# Patient Record
Sex: MALE | Race: WHITE | NOT HISPANIC OR LATINO
[De-identification: names, ages, dates, MRNs, and addresses within clinical notes are randomized per-mention and may not be internally consistent; named-entity substitution may affect disease eponyms.]

---

## 2020-02-13 ENCOUNTER — APPOINTMENT (OUTPATIENT)
Dept: GASTROENTEROLOGY | Facility: CLINIC | Age: 75
End: 2020-02-13
Payer: MEDICARE

## 2020-02-13 VITALS
DIASTOLIC BLOOD PRESSURE: 90 MMHG | HEART RATE: 93 BPM | TEMPERATURE: 99.1 F | OXYGEN SATURATION: 98 % | HEIGHT: 69 IN | BODY MASS INDEX: 28.44 KG/M2 | SYSTOLIC BLOOD PRESSURE: 170 MMHG | WEIGHT: 192 LBS

## 2020-02-13 DIAGNOSIS — K62.5 HEMORRHAGE OF ANUS AND RECTUM: ICD-10-CM

## 2020-02-13 DIAGNOSIS — K59.09 OTHER CONSTIPATION: ICD-10-CM

## 2020-02-13 PROCEDURE — 99203 OFFICE O/P NEW LOW 30 MIN: CPT

## 2020-02-13 RX ORDER — SODIUM SULFATE, POTASSIUM SULFATE, MAGNESIUM SULFATE 17.5; 3.13; 1.6 G/ML; G/ML; G/ML
17.5-3.13-1.6 SOLUTION, CONCENTRATE ORAL
Qty: 1 | Refills: 0 | Status: ACTIVE | COMMUNITY
Start: 2020-02-13 | End: 1900-01-01

## 2020-02-13 RX ORDER — DILTIAZEM HYDROCHLORIDE 120 MG/1
120 CAPSULE, EXTENDED RELEASE ORAL
Refills: 0 | Status: ACTIVE | COMMUNITY

## 2020-02-13 NOTE — PHYSICAL EXAM
[General Appearance - Alert] : alert [General Appearance - In No Acute Distress] : in no acute distress [General Appearance - Well Nourished] : well nourished [General Appearance - Well-Appearing] : healthy appearing [General Appearance - Well Developed] : well developed [Neck Appearance] : the appearance of the neck was normal [Sclera] : the sclera and conjunctiva were normal [Neck Cervical Mass (___cm)] : no neck mass was observed [Jugular Venous Distention Increased] : there was no jugular-venous distention [Apical Impulse] : the apical impulse was normal [Auscultation Breath Sounds / Voice Sounds] : lungs were clear to auscultation bilaterally [Heart Rate And Rhythm] : heart rate was normal and rhythm regular [Full Pulse] : the pedal pulses are present [Edema] : there was no peripheral edema [Abdomen Tenderness] : non-tender [Bowel Sounds] : normal bowel sounds [Abdomen Soft] : soft [Abdomen Mass (___ Cm)] : no abdominal mass palpated [No Rectal Mass] : no rectal mass [Occult Blood Positive] : stool was negative for occult blood [Normal Sphincter Tone] : normal sphincter tone [Cervical Lymph Nodes Enlarged Anterior Bilaterally] : anterior cervical [Cervical Lymph Nodes Enlarged Posterior Bilaterally] : posterior cervical [Supraclavicular Lymph Nodes Enlarged Bilaterally] : supraclavicular [Axillary Lymph Nodes Enlarged Bilaterally] : axillary [Femoral Lymph Nodes Enlarged Bilaterally] : femoral [Inguinal Lymph Nodes Enlarged Bilaterally] : inguinal [No CVA Tenderness] : no ~M costovertebral angle tenderness [No Spinal Tenderness] : no spinal tenderness [Abnormal Walk] : normal gait [Nail Clubbing] : no clubbing  or cyanosis of the fingernails [Involuntary Movements] : no involuntary movements were seen [Musculoskeletal - Swelling] : no joint swelling seen [Skin Color & Pigmentation] : normal skin color and pigmentation [Motor Tone] : muscle strength and tone were normal [] : no rash [No Focal Deficits] : no focal deficits [Skin Turgor] : normal skin turgor [Impaired Insight] : insight and judgment were intact [Oriented To Time, Place, And Person] : oriented to person, place, and time [Affect] : the affect was normal

## 2020-02-13 NOTE — HISTORY OF PRESENT ILLNESS
[de-identified] : Dr. Willian Corona will be taking care of this patient who is recently moved from [place]\par \par The patient has chronic constipation\par \par He's noticed blood per rectum on the tissue paper and toilet water\par \par He is not weak or dizzy\par \par The stool is brown\par \par The stool is hard\par \par Is not tender flat\par \par No abdominal pain\par \par No pelvic, rectal or anal pain\par \par He's tried Colace without benefit\par \par Dulcolax some help\par \par No recent colonoscopy\par \par No family history of colorectal cancer\par \par His peripheral vascular disease and takes an aspirin\par \par He does not have any coronary disease that is aware of

## 2020-02-13 NOTE — ASSESSMENT
[FreeTextEntry1] : Impression\par \par Chronic constipation\par \par Bright red blood per rectum\par \par Hard stool on rectal examination, no mass, possible internal hemorrhoid\par \par Suggest\par \par MiraLax on a daily basis\par \par Dulcolax at night\par \par Colonoscopy\par \par Suprep\par \par The laxative, or its risks benefits and alternatives have been thoroughly reviewed with the patient in great detail. The laxative instructions have been reviewed in great detail with the patient.\par \par Risks/benefits:\par The procedure, the risks and benefits and alternatives have been reviewed in great detail with the patient.  Risks including, but not limited to sedation such as cardiac and pulmonary compromise, the procedure itself such as bleeding requiring hospitalization, transfusion, surgery, temporary or permanent colostomy.  Perforation or puncture of the requiring hospitalization, surgery, temporary colostomy.\par It has been explained to the patient that though colonoscopy is thought to be the best screening exam for colon cancer and polyps, no screening exam can find all colon polyps or cancers.  \par The patient expresses understanding of the procedure and consents to undergoing the procedure.\par \par

## 2020-03-31 ENCOUNTER — APPOINTMENT (OUTPATIENT)
Dept: GASTROENTEROLOGY | Facility: AMBULATORY MEDICAL SERVICES | Age: 75
End: 2020-03-31

## 2020-07-27 ENCOUNTER — TRANSCRIPTION ENCOUNTER (OUTPATIENT)
Age: 75
End: 2020-07-27

## 2020-07-27 ENCOUNTER — INPATIENT (INPATIENT)
Facility: HOSPITAL | Age: 75
LOS: 3 days | Discharge: NURSING, REHAB OR LONG-TERM CARE | DRG: 470 | End: 2020-07-31
Attending: FAMILY MEDICINE | Admitting: INTERNAL MEDICINE
Payer: MEDICARE

## 2020-07-27 VITALS
SYSTOLIC BLOOD PRESSURE: 164 MMHG | WEIGHT: 179.9 LBS | HEART RATE: 86 BPM | DIASTOLIC BLOOD PRESSURE: 91 MMHG | HEIGHT: 68 IN | OXYGEN SATURATION: 96 % | RESPIRATION RATE: 18 BRPM | TEMPERATURE: 98 F

## 2020-07-27 DIAGNOSIS — Z98.890 OTHER SPECIFIED POSTPROCEDURAL STATES: Chronic | ICD-10-CM

## 2020-07-27 DIAGNOSIS — S72.002A FRACTURE OF UNSPECIFIED PART OF NECK OF LEFT FEMUR, INITIAL ENCOUNTER FOR CLOSED FRACTURE: ICD-10-CM

## 2020-07-27 LAB
ALBUMIN SERPL ELPH-MCNC: 3.9 G/DL — SIGNIFICANT CHANGE UP (ref 3.3–5)
ALP SERPL-CCNC: 101 U/L — SIGNIFICANT CHANGE UP (ref 40–120)
ALT FLD-CCNC: 20 U/L — SIGNIFICANT CHANGE UP (ref 12–78)
ANION GAP SERPL CALC-SCNC: 4 MMOL/L — LOW (ref 5–17)
APPEARANCE UR: CLEAR — SIGNIFICANT CHANGE UP
AST SERPL-CCNC: 22 U/L — SIGNIFICANT CHANGE UP (ref 15–37)
BACTERIA # UR AUTO: ABNORMAL
BASOPHILS # BLD AUTO: 0.08 K/UL — SIGNIFICANT CHANGE UP (ref 0–0.2)
BASOPHILS NFR BLD AUTO: 0.7 % — SIGNIFICANT CHANGE UP (ref 0–2)
BILIRUB SERPL-MCNC: 1.2 MG/DL — SIGNIFICANT CHANGE UP (ref 0.2–1.2)
BILIRUB UR-MCNC: NEGATIVE — SIGNIFICANT CHANGE UP
BLD GP AB SCN SERPL QL: SIGNIFICANT CHANGE UP
BUN SERPL-MCNC: 25 MG/DL — HIGH (ref 7–23)
CALCIUM SERPL-MCNC: 9.5 MG/DL — SIGNIFICANT CHANGE UP (ref 8.5–10.1)
CHLORIDE SERPL-SCNC: 108 MMOL/L — SIGNIFICANT CHANGE UP (ref 96–108)
CK SERPL-CCNC: 217 U/L — SIGNIFICANT CHANGE UP (ref 26–308)
CO2 SERPL-SCNC: 27 MMOL/L — SIGNIFICANT CHANGE UP (ref 22–31)
COLOR SPEC: YELLOW — SIGNIFICANT CHANGE UP
CREAT SERPL-MCNC: 1.4 MG/DL — HIGH (ref 0.5–1.3)
DIFF PNL FLD: ABNORMAL
EOSINOPHIL # BLD AUTO: 0 K/UL — SIGNIFICANT CHANGE UP (ref 0–0.5)
EOSINOPHIL NFR BLD AUTO: 0 % — SIGNIFICANT CHANGE UP (ref 0–6)
EPI CELLS # UR: SIGNIFICANT CHANGE UP
GLUCOSE SERPL-MCNC: 116 MG/DL — HIGH (ref 70–99)
GLUCOSE UR QL: NEGATIVE — SIGNIFICANT CHANGE UP
HCT VFR BLD CALC: 43.8 % — SIGNIFICANT CHANGE UP (ref 39–50)
HGB BLD-MCNC: 15.1 G/DL — SIGNIFICANT CHANGE UP (ref 13–17)
HYALINE CASTS # UR AUTO: ABNORMAL /LPF
IMM GRANULOCYTES NFR BLD AUTO: 0.4 % — SIGNIFICANT CHANGE UP (ref 0–1.5)
INR BLD: 1.01 RATIO — SIGNIFICANT CHANGE UP (ref 0.88–1.16)
KETONES UR-MCNC: ABNORMAL
LEUKOCYTE ESTERASE UR-ACNC: NEGATIVE — SIGNIFICANT CHANGE UP
LYMPHOCYTES # BLD AUTO: 0.78 K/UL — LOW (ref 1–3.3)
LYMPHOCYTES # BLD AUTO: 6.4 % — LOW (ref 13–44)
MCHC RBC-ENTMCNC: 31.3 PG — SIGNIFICANT CHANGE UP (ref 27–34)
MCHC RBC-ENTMCNC: 34.5 GM/DL — SIGNIFICANT CHANGE UP (ref 32–36)
MCV RBC AUTO: 90.7 FL — SIGNIFICANT CHANGE UP (ref 80–100)
MONOCYTES # BLD AUTO: 0.76 K/UL — SIGNIFICANT CHANGE UP (ref 0–0.9)
MONOCYTES NFR BLD AUTO: 6.2 % — SIGNIFICANT CHANGE UP (ref 2–14)
NEUTROPHILS # BLD AUTO: 10.61 K/UL — HIGH (ref 1.8–7.4)
NEUTROPHILS NFR BLD AUTO: 86.3 % — HIGH (ref 43–77)
NITRITE UR-MCNC: NEGATIVE — SIGNIFICANT CHANGE UP
NRBC # BLD: 0 /100 WBCS — SIGNIFICANT CHANGE UP (ref 0–0)
PH UR: 5 — SIGNIFICANT CHANGE UP (ref 5–8)
PLATELET # BLD AUTO: 236 K/UL — SIGNIFICANT CHANGE UP (ref 150–400)
POTASSIUM SERPL-MCNC: 4.2 MMOL/L — SIGNIFICANT CHANGE UP (ref 3.5–5.3)
POTASSIUM SERPL-SCNC: 4.2 MMOL/L — SIGNIFICANT CHANGE UP (ref 3.5–5.3)
PROT SERPL-MCNC: 7.9 G/DL — SIGNIFICANT CHANGE UP (ref 6–8.3)
PROT UR-MCNC: 30 MG/DL
PROTHROM AB SERPL-ACNC: 11.8 SEC — SIGNIFICANT CHANGE UP (ref 10.6–13.6)
RBC # BLD: 4.83 M/UL — SIGNIFICANT CHANGE UP (ref 4.2–5.8)
RBC # FLD: 12.7 % — SIGNIFICANT CHANGE UP (ref 10.3–14.5)
RBC CASTS # UR COMP ASSIST: SIGNIFICANT CHANGE UP /HPF (ref 0–4)
SARS-COV-2 RNA SPEC QL NAA+PROBE: SIGNIFICANT CHANGE UP
SODIUM SERPL-SCNC: 139 MMOL/L — SIGNIFICANT CHANGE UP (ref 135–145)
SP GR SPEC: 1.02 — SIGNIFICANT CHANGE UP (ref 1.01–1.02)
UROBILINOGEN FLD QL: NEGATIVE — SIGNIFICANT CHANGE UP
WBC # BLD: 12.28 K/UL — HIGH (ref 3.8–10.5)
WBC # FLD AUTO: 12.28 K/UL — HIGH (ref 3.8–10.5)
WBC UR QL: SIGNIFICANT CHANGE UP

## 2020-07-27 PROCEDURE — 99223 1ST HOSP IP/OBS HIGH 75: CPT | Mod: AI

## 2020-07-27 PROCEDURE — 72131 CT LUMBAR SPINE W/O DYE: CPT | Mod: 26

## 2020-07-27 PROCEDURE — 73552 X-RAY EXAM OF FEMUR 2/>: CPT | Mod: 26,LT

## 2020-07-27 PROCEDURE — 99285 EMERGENCY DEPT VISIT HI MDM: CPT

## 2020-07-27 PROCEDURE — 73700 CT LOWER EXTREMITY W/O DYE: CPT | Mod: 26,LT

## 2020-07-27 PROCEDURE — 99222 1ST HOSP IP/OBS MODERATE 55: CPT

## 2020-07-27 PROCEDURE — 72110 X-RAY EXAM L-2 SPINE 4/>VWS: CPT | Mod: 26

## 2020-07-27 PROCEDURE — 93010 ELECTROCARDIOGRAM REPORT: CPT

## 2020-07-27 PROCEDURE — 71045 X-RAY EXAM CHEST 1 VIEW: CPT | Mod: 26

## 2020-07-27 PROCEDURE — 73502 X-RAY EXAM HIP UNI 2-3 VIEWS: CPT | Mod: 26,LT

## 2020-07-27 RX ORDER — ATORVASTATIN CALCIUM 80 MG/1
1 TABLET, FILM COATED ORAL
Qty: 0 | Refills: 0 | DISCHARGE

## 2020-07-27 RX ORDER — ATORVASTATIN CALCIUM 80 MG/1
0 TABLET, FILM COATED ORAL
Qty: 0 | Refills: 0 | DISCHARGE

## 2020-07-27 RX ORDER — SODIUM CHLORIDE 9 MG/ML
1000 INJECTION INTRAMUSCULAR; INTRAVENOUS; SUBCUTANEOUS ONCE
Refills: 0 | Status: COMPLETED | OUTPATIENT
Start: 2020-07-27 | End: 2020-07-27

## 2020-07-27 RX ORDER — HYDRALAZINE HCL 50 MG
5 TABLET ORAL EVERY 8 HOURS
Refills: 0 | Status: DISCONTINUED | OUTPATIENT
Start: 2020-07-27 | End: 2020-07-28

## 2020-07-27 RX ORDER — OXYCODONE HYDROCHLORIDE 5 MG/1
10 TABLET ORAL EVERY 6 HOURS
Refills: 0 | Status: DISCONTINUED | OUTPATIENT
Start: 2020-07-27 | End: 2020-07-28

## 2020-07-27 RX ORDER — HEPARIN SODIUM 5000 [USP'U]/ML
5000 INJECTION INTRAVENOUS; SUBCUTANEOUS EVERY 8 HOURS
Refills: 0 | Status: COMPLETED | OUTPATIENT
Start: 2020-07-27 | End: 2020-07-27

## 2020-07-27 RX ORDER — DILTIAZEM HCL 120 MG
120 CAPSULE, EXT RELEASE 24 HR ORAL DAILY
Refills: 0 | Status: DISCONTINUED | OUTPATIENT
Start: 2020-07-27 | End: 2020-07-27

## 2020-07-27 RX ORDER — DILTIAZEM HCL 120 MG
1 CAPSULE, EXT RELEASE 24 HR ORAL
Qty: 0 | Refills: 0 | DISCHARGE

## 2020-07-27 RX ORDER — DILTIAZEM HCL 120 MG
0 CAPSULE, EXT RELEASE 24 HR ORAL
Qty: 0 | Refills: 0 | DISCHARGE

## 2020-07-27 RX ORDER — SODIUM CHLORIDE 9 MG/ML
1000 INJECTION, SOLUTION INTRAVENOUS
Refills: 0 | Status: DISCONTINUED | OUTPATIENT
Start: 2020-07-27 | End: 2020-07-28

## 2020-07-27 RX ORDER — KETOROLAC TROMETHAMINE 30 MG/ML
15 SYRINGE (ML) INJECTION ONCE
Refills: 0 | Status: DISCONTINUED | OUTPATIENT
Start: 2020-07-27 | End: 2020-07-27

## 2020-07-27 RX ORDER — TRAMADOL HYDROCHLORIDE 50 MG/1
50 TABLET ORAL EVERY 6 HOURS
Refills: 0 | Status: DISCONTINUED | OUTPATIENT
Start: 2020-07-27 | End: 2020-07-28

## 2020-07-27 RX ORDER — DILTIAZEM HCL 120 MG
120 CAPSULE, EXT RELEASE 24 HR ORAL DAILY
Refills: 0 | Status: DISCONTINUED | OUTPATIENT
Start: 2020-07-27 | End: 2020-07-28

## 2020-07-27 RX ORDER — MORPHINE SULFATE 50 MG/1
2 CAPSULE, EXTENDED RELEASE ORAL ONCE
Refills: 0 | Status: DISCONTINUED | OUTPATIENT
Start: 2020-07-27 | End: 2020-07-27

## 2020-07-27 RX ORDER — SODIUM CHLORIDE 9 MG/ML
1000 INJECTION INTRAMUSCULAR; INTRAVENOUS; SUBCUTANEOUS
Refills: 0 | Status: DISCONTINUED | OUTPATIENT
Start: 2020-07-27 | End: 2020-07-27

## 2020-07-27 RX ORDER — ACETAMINOPHEN 500 MG
650 TABLET ORAL EVERY 6 HOURS
Refills: 0 | Status: DISCONTINUED | OUTPATIENT
Start: 2020-07-27 | End: 2020-07-28

## 2020-07-27 RX ORDER — ATORVASTATIN CALCIUM 80 MG/1
20 TABLET, FILM COATED ORAL AT BEDTIME
Refills: 0 | Status: DISCONTINUED | OUTPATIENT
Start: 2020-07-27 | End: 2020-07-28

## 2020-07-27 RX ORDER — ASPIRIN/CALCIUM CARB/MAGNESIUM 324 MG
1 TABLET ORAL
Qty: 0 | Refills: 0 | DISCHARGE

## 2020-07-27 RX ORDER — ASPIRIN/CALCIUM CARB/MAGNESIUM 324 MG
81 TABLET ORAL DAILY
Refills: 0 | Status: DISCONTINUED | OUTPATIENT
Start: 2020-07-27 | End: 2020-07-28

## 2020-07-27 RX ADMIN — MORPHINE SULFATE 2 MILLIGRAM(S): 50 CAPSULE, EXTENDED RELEASE ORAL at 14:14

## 2020-07-27 RX ADMIN — Medication 15 MILLIGRAM(S): at 16:30

## 2020-07-27 RX ADMIN — SODIUM CHLORIDE 1000 MILLILITER(S): 9 INJECTION INTRAMUSCULAR; INTRAVENOUS; SUBCUTANEOUS at 14:14

## 2020-07-27 RX ADMIN — Medication 15 MILLIGRAM(S): at 19:44

## 2020-07-27 RX ADMIN — SODIUM CHLORIDE 1000 MILLILITER(S): 9 INJECTION INTRAMUSCULAR; INTRAVENOUS; SUBCUTANEOUS at 20:58

## 2020-07-27 RX ADMIN — TRAMADOL HYDROCHLORIDE 50 MILLIGRAM(S): 50 TABLET ORAL at 21:57

## 2020-07-27 RX ADMIN — MORPHINE SULFATE 2 MILLIGRAM(S): 50 CAPSULE, EXTENDED RELEASE ORAL at 16:46

## 2020-07-27 RX ADMIN — TRAMADOL HYDROCHLORIDE 50 MILLIGRAM(S): 50 TABLET ORAL at 22:57

## 2020-07-27 NOTE — ED PROVIDER NOTE - ATTENDING CONTRIBUTION TO CARE
I have personally performed a face to face bedside history and physical examination of this patient. I have discussed the history, examination, review of systems, assessment and plan of management with the resident. I have reviewed the electronic medical record and amended it to reflect my history, review of systems, physical exam, assessment and plan. Patient brought in by ambulance s/p fall yesterday, c/o left hip pain, patient alert and oriented, states he has a history of left leg weakness, uses a cane to ambulate, last night while walking his left leg gave out and he collapsed to the ground, patient c/o left hip, medial thigh pain, worse with movement, f/u labs, xrays, ct scan, pain control, admit.

## 2020-07-27 NOTE — ED PROVIDER NOTE - NS ED ROS FT
CONSTITUTIONAL: No fevers or chills  HEENT:  No headache  RESPIRATORY: No cough, No shortness of breath  CARDIOVASCULAR: No chest pain   GASTROINTESTINAL: No abdominal pain, no nausea,   GENITOURINARY: No incontinence  NEUROLOGICAL: chronic lower exremity weakness, no dizziness   MSK: L sided hip pain, L lower back pain that radiates down leg to knee  All other review of systems is negative unless indicated above.

## 2020-07-27 NOTE — CONSULT NOTE ADULT - SUBJECTIVE AND OBJECTIVE BOX
[facility] - Billy Tejada, Angel Caballero, Robbi, Luisito, Onel Aguilar  Office Number: [phone]    Initial Consult Note  CHIEF COMPLAINT: Patient is a 74y old  Male who presents with a chief complaint of fall  HPI:  73 yo male PMH HTN, HLD, herniated lumbar disc presents to ED s/p fall on his L side last night around 11:30 pm. Patient states he fell on his L side and injured his L hip and L lower back with sharp pain that radiates down his leg. Patient states he has a history of generalized leg weakness with occasional "leg-buckling." Patient believes that is what caused him to fall. Denies dizziness or HA at time of fall. Denies hitting head or LOC. Patient states his son helped him up after the fall and laid him in bed, he has not be able to walk or move leg since fall due to pain. Tried taking Motrin 200 mg with minimal relief.     In ED. T(F): 97.8 HR: 86 BP: 164/91 RR: 18 SpO2: 96%. Labs remarkable for WBC 12.28, creat 1.40, . Ct hip showed acute mildly displaced left femoral neck fracture. Plan for OR for operative fixation left hip with hemiarthroplasty. Cardiology consulted for Cardiac optimization pre/post op. Denies fever, chills, chest pain, SOB, palpitation, orthopnea, PND, dizziness, lightheadedness, weakness, nausea, vomiting, diarrhea, constipation, abdominal pain, bladder and bowel problems.     Allergies  Allergy Status Unknown  No Known Allergies    PAST MEDICAL & SURGICAL HISTORY:  Hyperlipidemia  Hypertension  Hypertension  Hyperlipidemia  Hypertension  Hypertension  H/O hernia repair    MEDICATIONS  (STANDING):    MEDICATIONS  (PRN):    REVIEW OF SYSTEMS   CONSTITUTIONAL: No fevers, No chills, No fatigue, No weight gain  EYES: No vision changes, No vertigo, No throat pain   ENT: No congestion, No ear pain, No sore throat.  NECK: No pain, No stiffness  RESPIRATORY: No shortness of breath, No cough, No wheezing, No hemoptysis  CARDIOVASCULAR: No chest pain. No palpitations, No PONCE, No orthopnea, No PND, No pleuritic pain  GASTROINTESTINAL: No abdominal pain, No nausea, No vomiting, No hematemesis, No diarrhea No constipation. No melena  GENITOURINARY: No dysuria, No frequency, No incontinence, No hematuria  NEUROLOGICAL: No dizziness, No lightheadedness, No syncope, No LOC, No headache, No numbness, No weakness  MUSCULOSKELETAL: + joint pain, No joint swelling.  PSYCHIATRIC: No anxiety, No depression  DERMATOLOGY: No diaphoresis. No itching, No rashes, No pressure ulcers  HEME/LYMPH: No easy bruising, or bleeding gums  All other review of systems is negative unless indicated above.    VITAL SIGNS:   Vital Signs Last 24 Hrs  T(C): 36.6 (27 Jul 2020 12:25), Max: 36.6 (27 Jul 2020 11:55)  T(F): 97.8 (27 Jul 2020 12:25), Max: 97.8 (27 Jul 2020 11:55)  HR: 86 (27 Jul 2020 12:25) (86 - 86)  BP: 164/91 (27 Jul 2020 12:25) (164/91 - 164/91)  BP(mean): --  RR: 18 (27 Jul 2020 12:25) (18 - 18)  SpO2: 96% (27 Jul 2020 12:25) (96% - 96%)    Physical Exam:    Appearance: NAD, no distress, alert, Well developed   HEENT: Moist Mucous Membranes, Anicteric  Cardiovascular: Regular rate and rhythm, Normal S1 S2, No JVD, No murmurs, No rubs, gallops or clicks  Respiratory: Lungs clear to auscultation. No rales, No rhonchi, No wheezing. No tenderness to palpation  Gastrointestinal:  Soft, Non-tender, + BS  Neurologic: Non-focal  Skin: Warm and dry, No rashes, No ecchymosis, No cyanosis, No ulcers   Musculoskeletal: + right hip ppain and limited ROM No clubbing, No cyanosis  Vascular: Peripheral pulses palpable 2+ bilaterally  Psychiatry: Mood & affect appropriate  Lymph: No peripheral edema.     I&O's Summary      LABS: All Labs Reviewed:                        15.1   12.28 )-----------( 236      ( 27 Jul 2020 14:18 )             43.8     27 Jul 2020 14:18    139    |  108    |  25     ----------------------------<  116    4.2     |  27     |  1.40     Ca    9.5        27 Jul 2020 14:18    TPro  7.9    /  Alb  3.9    /  TBili  1.2    /  DBili  x      /  AST  22     /  ALT  20     /  AlkPhos  101    27 Jul 2020 14:18  PT/INR - ( 27 Jul 2020 14:18 )   PT: 11.8 sec;   INR: 1.01 ratio    PTT - ( 27 Jul 2020 14:18 )  PTT:31.5 sec  Creatine Kinase, Serum: 217 U/L (07-27-20 @ 14:18)    < from: CT Hip No Cont, Left (07.27.20 @ 14:56) >  IMPRESSION: Acute mildly displaced left femoral neck fracture, as above    < end of copied text > [facility] - Billy Tejada, Angel Caballero, Robbi, Luisito, Onel Aguilar  Office Number: [phone]    Initial Consult Note  CHIEF COMPLAINT: Patient is a 74y old  Male who presents with a chief complaint of fall  HPI:  73 yo male PMH HTN, HLD, rapid HR (on Cardizem no AC) herniated lumbar disc presents to ED s/p fall on his L side last night around 11:30 pm. Patient states he fell on his L side and injured his L hip and L lower back with sharp pain that radiates down his leg. Patient states he has a history of generalized leg weakness with occasional "leg-buckling." Patient believes that is what caused him to fall. Denies dizziness or HA at time of fall. Denies hitting head or LOC. Patient states his son helped him up after the fall and laid him in bed, he has not be able to walk or move leg since fall due to pain. Tried taking Motrin 200 mg with minimal relief.     In ED. T(F): 97.8 HR: 86 BP: 164/91 RR: 18 SpO2: 96%. Labs remarkable for WBC 12.28, creat 1.40, . Ct hip showed acute mildly displaced left femoral neck fracture. Plan for OR for operative fixation left hip with hemiarthroplasty. Cardiology consulted for Cardiac optimization pre/post op. Denies fever, chills, chest pain, SOB, palpitation, orthopnea, PND, dizziness, lightheadedness, weakness, nausea, vomiting, diarrhea, constipation, abdominal pain, bladder and bowel problems.     Patient followed up with cardiologist in [place], had stress, test and ECHO done last year which is unremarkable per patient.     Allergies  Allergy Status Unknown  No Known Allergies    PAST MEDICAL & SURGICAL HISTORY:  Hyperlipidemia  Hypertension  Hypertension  Hyperlipidemia  Hypertension  Hypertension  H/O hernia repair    MEDICATIONS  (STANDING):    MEDICATIONS  (PRN):    REVIEW OF SYSTEMS   CONSTITUTIONAL: No fevers, No chills, No fatigue, No weight gain  EYES: No vision changes, No vertigo, No throat pain   ENT: No congestion, No ear pain, No sore throat.  NECK: No pain, No stiffness  RESPIRATORY: No shortness of breath, No cough, No wheezing, No hemoptysis  CARDIOVASCULAR: No chest pain. No palpitations, No PONCE, No orthopnea, No PND, No pleuritic pain  GASTROINTESTINAL: No abdominal pain, No nausea, No vomiting, No hematemesis, No diarrhea No constipation. No melena  GENITOURINARY: No dysuria, No frequency, No incontinence, No hematuria  NEUROLOGICAL: No dizziness, No lightheadedness, No syncope, No LOC, No headache, No numbness, No weakness  MUSCULOSKELETAL: + joint pain, No joint swelling.  PSYCHIATRIC: No anxiety, No depression  DERMATOLOGY: No diaphoresis. No itching, No rashes, No pressure ulcers  HEME/LYMPH: No easy bruising, or bleeding gums  All other review of systems is negative unless indicated above.    VITAL SIGNS:   Vital Signs Last 24 Hrs  T(C): 36.6 (27 Jul 2020 12:25), Max: 36.6 (27 Jul 2020 11:55)  T(F): 97.8 (27 Jul 2020 12:25), Max: 97.8 (27 Jul 2020 11:55)  HR: 86 (27 Jul 2020 12:25) (86 - 86)  BP: 164/91 (27 Jul 2020 12:25) (164/91 - 164/91)  BP(mean): --  RR: 18 (27 Jul 2020 12:25) (18 - 18)  SpO2: 96% (27 Jul 2020 12:25) (96% - 96%)    Physical Exam:    Appearance: NAD, no distress, alert, Well developed   HEENT: Moist Mucous Membranes, Anicteric  Cardiovascular: Regular rate and rhythm, Normal S1 S2, No JVD, No murmurs, No rubs, gallops or clicks  Respiratory: Lungs clear to auscultation. No rales, No rhonchi, No wheezing. No tenderness to palpation  Gastrointestinal:  Soft, Non-tender, + BS  Neurologic: Non-focal  Skin: Warm and dry, No rashes, No ecchymosis, No cyanosis, No ulcers   Musculoskeletal: + right hip ppain and limited ROM No clubbing, No cyanosis  Vascular: Peripheral pulses palpable 2+ bilaterally  Psychiatry: Mood & affect appropriate  Lymph: No peripheral edema.     I&O's Summary      LABS: All Labs Reviewed:                        15.1   12.28 )-----------( 236      ( 27 Jul 2020 14:18 )             43.8     27 Jul 2020 14:18    139    |  108    |  25     ----------------------------<  116    4.2     |  27     |  1.40     Ca    9.5        27 Jul 2020 14:18    TPro  7.9    /  Alb  3.9    /  TBili  1.2    /  DBili  x      /  AST  22     /  ALT  20     /  AlkPhos  101    27 Jul 2020 14:18  PT/INR - ( 27 Jul 2020 14:18 )   PT: 11.8 sec;   INR: 1.01 ratio    PTT - ( 27 Jul 2020 14:18 )  PTT:31.5 sec  Creatine Kinase, Serum: 217 U/L (07-27-20 @ 14:18)    < from: CT Hip No Cont, Left (07.27.20 @ 14:56) >  IMPRESSION: Acute mildly displaced left femoral neck fracture, as above    < end of copied text >

## 2020-07-27 NOTE — CONSULT NOTE ADULT - ASSESSMENT
74M with degenerative spondylolithesis and a displaced left femoral neck fracture    - Patient with above diagnosis, will require operative fixation for optimal outcome of left hip with hemiarthroplasty.   - Pain control  - NPO/IVF  - CBC/BMP/Coags/UA/T+S x2  - EKG/CXR  - COVID test  - Medical and cardiac clearance documented for possible OR today   - Discussed with Dr. Cespedes who agrees with plan. will advise if plan changes

## 2020-07-27 NOTE — H&P ADULT - NSHPREVIEWOFSYSTEMS_GEN_ALL_CORE
Denies headaches, nausea, vomiting, chest pain, SOB, palpitations, abdominal pain, constipation, diarrhea, melena, hematochezia, dysuria.

## 2020-07-27 NOTE — H&P ADULT - ATTENDING COMMENTS
Orthopedic attending statement    All questions answered for patient and his daughter in ER.  I discussed with them my concern for dislocation because he has regular buckling of the both legs due to neurologic weakness in legs.  Buckling may post him at higher risk of dislocation of hip.  We will also work him up for lumbar stenosis once he has recovered from him surgery to evaluate the role of laminectomy and decompression for stenosis.  Leg weakness is progressive but symptoms started 16 years ago and buckling has been going for > 2 years.    Risks and benefits discussed.  patient agrees to proceed.   Plan for left hip hemiarthroplasty.

## 2020-07-27 NOTE — H&P ADULT - NSHPPHYSICALEXAM_GEN_ALL_CORE
T(C): 36.6 (07-27-20 @ 12:25), Max: 36.6 (07-27-20 @ 11:55)  HR: 86 (07-27-20 @ 12:25) (86 - 86)  BP: 164/91 (07-27-20 @ 12:25) (164/91 - 164/91)  RR: 18 (07-27-20 @ 12:25) (18 - 18)  SpO2: 96% (07-27-20 @ 12:25) (96% - 96%)  Wt(kg): --    Physical Exam:   GENERAL: well-groomed, well-developed, NAD  HEENT: head NC/AT; EOM intact, conjunctiva & sclera clear; hearing grossly intact, moist mucous membranes  NECK: supple, no JVD  RESPIRATORY: CTA B/L, no wheezing, rales, rhonchi or rubs  CARDIOVASCULAR: S1&S2, RRR, no murmurs or gallops  ABDOMEN: soft, non-tender, non-distended, + Bowel sounds x4 quadrants, no guarding, rebound or rigidity  MUSCULOSKELETAL:  no clubbing, cyanosis or edema of all 4 extremities, LLE externally rotated  LYMPH: no cervical lymphadenopathy  VASCULAR: Radial pulses 2+ bilaterally, no varicose veins   SKIN: warm and dry, color normal  NEUROLOGIC: AA&O X3, CN2-12 intact w/ no focal deficits, no sensory loss, motor Strength 5/5 in UE b/l and RLE. decreased ROM LLE  Psych: Normal mood and affect, normal behavior

## 2020-07-27 NOTE — CONSULT NOTE ADULT - ASSESSMENT
DONA  HTN DONA  HTN  L Femoral Neck Fx    -Unknown baseline creatinine  -DONA likely 2/2 NSAID usage combined with decreased PO intake  -Check Urines lytes  -Check UA  -Monitor UOP with Strict I&O  -IVF  -Hold lisinopril  -AVOID NSAIDS FOR PAIN DONA  HTN  L Femoral Neck Fx    -Unknown baseline creatinine  -DONA likely 2/2 NSAID usage combined with decreased PO intake  -Check Urines lytes  -Check UA  -Monitor UOP with Strict I&O  -IVF  -Hold lisinopril  -AVOID NSAIDS FOR PAIN  -Can give PRN hydralazine for BP

## 2020-07-27 NOTE — H&P ADULT - ASSESSMENT
Pt is a 73 yo M presenting from home after a fall resulting in L Femoral neck fracture. PMH HTN, HLD, "fast" HR.     L Femoral Neck Fracture:   -patient planned for orthopedic surgery today.   -RCRI 0 points.   -patient is medically optimized for orthopedic procedure if clinically warranted      DONA: will hold enalapril  -will give gentle IVF  -monitor BP closely as he is hypertensive.   -repeat BMP in AM    HLD: continue statin Pt is a 73 yo M presenting from home after a fall resulting in L Femoral neck fracture. PMH HTN, HLD, "fast" HR.     L Femoral Neck Fracture:   -patient planned for orthopedic surgery today.   -RCRI 0 points.   -patient is medically optimized for orthopedic procedure if clinically warranted  -mgmt per surgical team  -pain control as ordered.   -avoid NSAID's due to DONA    DONA: will hold enalapril  -will give gentle IVF  -monitor BP closely as he is hypertensive.   -repeat BMP in AM    HLD: continue statin    HTN: Continue Cardizem with hold parameters.     DVT ppx: SCD's, can add pharm DVT ppx if cleared by Ortho after surgery Pt is a 75 yo M presenting from home after a fall resulting in L Femoral neck fracture. PMH HTN, HLD, "fast" HR.     L Femoral Neck Fracture:   -patient planned for orthopedic surgery today.   -RCRI 0 points.   -patient is medically optimized for orthopedic procedure if clinically warranted  -mgmt per surgical team  -pain control as ordered.   -avoid NSAID's due to DONA    DONA: will hold enalapril  -will give gentle IVF  -monitor BP closely as he is hypertensive.   -repeat BMP in AM    HLD: continue statin    HTN: Continue Cardizem with hold parameters.     DVT ppx: SCD's, can add pharm DVT ppx if cleared by Ortho after surgery      Updated Orthopedic team

## 2020-07-27 NOTE — CONSULT NOTE ADULT - SUBJECTIVE AND OBJECTIVE BOX
Patient is a 74y old  Male who presents with a chief complaint of L Femoral neck fracture (27 Jul 2020 18:23)       HPI:  Pt is a 75 yo M presenting from home after a fall resulting in L Femoral neck fracture. PMH HTN, HLD, "fast" HR.   Pt seen and examined at bedside.   He states that last night he was walking when all of a sudden his L leg buckled at the knee causing him to fall. He denies any head traume or LOC. He states that his leg simply gave out. He denies any assoc focal leg weakness, numbness or tingling. He has L hip pain, worse with movement, sharp, currently 4/10 in severity. No radiation elsewhere. He has no other complaints at this time.   Denies past hx of MI/TIA/CVA/CHF/DM2. He has never had any adverse reaction to anesthesia. no family hx of adverse reactions to anesthesia. He has no other complaints at this time. He can ambulate 2 blocks without associated chest pain or SOB.   Of note he was noted to have DONA and reports having taken Motrin for the past few days every 4-6hrs. (27 Jul 2020 18:23)       PAST MEDICAL & SURGICAL HISTORY:  Hyperlipidemia  Hypertension  Hyperlipidemia  Hypertension  H/O hernia repair       FAMILY HISTORY:  NC    Social History:Non smoker    MEDICATIONS  (STANDING):  aspirin  chewable 81 milliGRAM(s) Oral daily  atorvastatin 20 milliGRAM(s) Oral at bedtime  diltiazem    milliGRAM(s) Oral daily  sodium chloride 0.9%. 1000 milliLiter(s) (75 mL/Hr) IV Continuous <Continuous>    MEDICATIONS  (PRN):  acetaminophen   Tablet .. 650 milliGRAM(s) Oral every 6 hours PRN Mild Pain (1 - 3)  oxyCODONE    IR 10 milliGRAM(s) Oral every 6 hours PRN Severe Pain (7 - 10)  traMADol 50 milliGRAM(s) Oral every 6 hours PRN Moderate Pain (4 - 6)   Meds reviewed    Allergies    No Known Allergies    Intolerances         REVIEW OF SYSTEMS:    CONSTITUTIONAL:  No weight loss   EYES: No eye pain, visual disturbances, or discharge  ENMT:  No difficulty hearing, tinnitus, vertigo; No sinus or throat pain  NECK: No pain or stiffness  BREASTS: No pain, masses, or nipple discharge  RESPIRATORY: No SOB. No wheeze. No PONCE  CARDIOVASCULAR: No chest pain, palpitations, dizziness,   GASTROINTESTINAL: No abdominal or epigastric pain. No nausea, vomiting, or hematemesis; No diarrhea or constipation. No melena or hematochezia  GENITOURINARY: No dysuria, frequency, hematuria, or incontinence  NEUROLOGICAL: No headaches, memory loss, loss of strength, numbness, or tremors  SKIN: Diffuse erythema, no blisters  LYMPH NODES: No enlarged glands  ENDOCRINE: No heat or cold intolerance; No hair loss  MUSCULOSKELETAL: No joint pain or swelling   PSYCHIATRIC: No depression, anxiety, mood swings, or difficulty sleeping  HEME/LYMPH: No easy bruising, or bleeding gums  ALLERY AND IMMUNOLOGIC: No hives or eczema      Vital Signs Last 24 Hrs  T(C): 37.7 (27 Jul 2020 19:41), Max: 37.7 (27 Jul 2020 19:41)  T(F): 99.8 (27 Jul 2020 19:41), Max: 99.8 (27 Jul 2020 19:41)  HR: 80 (27 Jul 2020 19:41) (80 - 86)  BP: 160/90 (27 Jul 2020 19:41) (160/90 - 164/91)  BP(mean): --  RR: 16 (27 Jul 2020 19:41) (16 - 18)  SpO2: 96% (27 Jul 2020 19:41) (96% - 96%)  Daily Height in cm: 172.72 (27 Jul 2020 12:25)    Daily     PHYSICAL EXAM:    GENERAL: NAD  HEAD:  Atraumatic, Normocephalic  EYES: EOMI,  conjunctiva and sclera clear  ENMT: No drainage from nares, no drainage from pinna  NECK: Supple, neck  veins full  NERVOUS SYSTEM:  Alert & Oriented X3, Good concentration; Motor Strength wnl upper and lower extremities  CHEST/LUNG: Clear to percussion bilaterally; No rales, rhonchi, wheezing, or rubs  HEART: Regular rate and rhythm; No murmurs, rubs, or gallops  ABDOMEN: Soft, Nontender, Nondistended; Bowel sounds present, obese  EXTREMITIES:  L ankle Edema  SKIN: No rashes or lesions, pale      LABS:                        15.1   12.28 )-----------( 236      ( 27 Jul 2020 14:18 )             43.8     07-27    139  |  108  |  25<H>  ----------------------------<  116<H>  4.2   |  27  |  1.40<H>    Ca    9.5      27 Jul 2020 14:18    TPro  7.9  /  Alb  3.9  /  TBili  1.2  /  DBili  x   /  AST  22  /  ALT  20  /  AlkPhos  101  07-27    PT/INR - ( 27 Jul 2020 14:18 )   PT: 11.8 sec;   INR: 1.01 ratio         PTT - ( 27 Jul 2020 14:18 )  PTT:31.5 sec            RADIOLOGY & ADDITIONAL TESTS: Patient is a 74y old  Male who presents with a chief complaint of L Femoral neck fracture (27 Jul 2020 18:23)       HPI:  Pt is a 75 yo M presenting from home after a fall resulting in L Femoral neck fracture. PMH HTN, HLD, "fast" HR.   Pt seen and examined at bedside.   He states that last night he was walking when all of a sudden his L leg buckled at the knee causing him to fall. He denies any head traume or LOC. He states that his leg simply gave out. He denies any assoc focal leg weakness, numbness or tingling. He has L hip pain, worse with movement, sharp, currently 4/10 in severity. No radiation elsewhere. He has no other complaints at this time.   Denies past hx of MI/TIA/CVA/CHF/DM2. He has never had any adverse reaction to anesthesia. no family hx of adverse reactions to anesthesia. He has no other complaints at this time. He can ambulate 2 blocks without associated chest pain or SOB.   Of note he was noted to have DONA and reports having taken Motrin for the last 2 days every 4 hours for pain.  Thinks he was taking 200mg tabs, but not sure.  He has never seen a nephrologist or been told of any issues with his kidneys.  He states he has had much PO intake today. Denies dysuria.  States he has some minor difficulty initiating his stream today which he has not had in the past, but felt he was able to void adequately.         PAST MEDICAL & SURGICAL HISTORY:  Hyperlipidemia  Hypertension  Hyperlipidemia  Hypertension  H/O hernia repair       FAMILY HISTORY:  NC    Social History:Non smoker    MEDICATIONS  (STANDING):  aspirin  chewable 81 milliGRAM(s) Oral daily  atorvastatin 20 milliGRAM(s) Oral at bedtime  diltiazem    milliGRAM(s) Oral daily  sodium chloride 0.9%. 1000 milliLiter(s) (75 mL/Hr) IV Continuous <Continuous>    MEDICATIONS  (PRN):  acetaminophen   Tablet .. 650 milliGRAM(s) Oral every 6 hours PRN Mild Pain (1 - 3)  oxyCODONE    IR 10 milliGRAM(s) Oral every 6 hours PRN Severe Pain (7 - 10)  traMADol 50 milliGRAM(s) Oral every 6 hours PRN Moderate Pain (4 - 6)   Meds reviewed    Allergies    No Known Allergies    Intolerances         REVIEW OF SYSTEMS:    CONSTITUTIONAL:  No weight loss   EYES: No eye pain, visual disturbances, or discharge  ENMT:  No difficulty hearing, tinnitus, vertigo; No sinus or throat pain  NECK: No pain or stiffness  BREASTS: No pain, masses, or nipple discharge  RESPIRATORY: No SOB. No wheeze. No PONCE  CARDIOVASCULAR: No chest pain, palpitations, dizziness,   GASTROINTESTINAL: No abdominal or epigastric pain. No nausea, vomiting, or hematemesis; No diarrhea or constipation.   GENITOURINARY: No dysuria, frequency, hematuria, or incontinence  NEUROLOGICAL: No headaches, memory loss, loss of strength, numbness, or tremors  SKIN: Diffuse erythema, no blisters  LYMPH NODES: No enlarged glands  ENDOCRINE: No heat or cold intolerance; No hair loss  MUSCULOSKELETAL: No joint pain or swelling   PSYCHIATRIC: No depression, anxiety, mood swings, or difficulty sleeping  HEME/LYMPH: No easy bruising, or bleeding gums  ALLERY AND IMMUNOLOGIC: No hives or eczema      Vital Signs Last 24 Hrs  T(C): 37.7 (27 Jul 2020 19:41), Max: 37.7 (27 Jul 2020 19:41)  T(F): 99.8 (27 Jul 2020 19:41), Max: 99.8 (27 Jul 2020 19:41)  HR: 80 (27 Jul 2020 19:41) (80 - 86)  BP: 160/90 (27 Jul 2020 19:41) (160/90 - 164/91)  BP(mean): --  RR: 16 (27 Jul 2020 19:41) (16 - 18)  SpO2: 96% (27 Jul 2020 19:41) (96% - 96%)  Daily Height in cm: 172.72 (27 Jul 2020 12:25)    Daily     PHYSICAL EXAM:    GENERAL: NAD  HEAD:  Atraumatic, Normocephalic  EYES: EOMI,  conjunctiva and sclera clear  ENMT: No drainage from nares, no drainage from pinna  NECK: Supple, neck  veins full  NERVOUS SYSTEM:  Alert & Oriented X3, Good concentration; Motor Strength wnl upper and lower extremities  CHEST/LUNG: Clear to percussion bilaterally; No rales, rhonchi, wheezing, or rubs  HEART: Regular rate and rhythm; No murmurs, rubs, or gallops  ABDOMEN: Soft, Nontender, Nondistended; Bowel sounds present, obese  EXTREMITIES:  L ankle Edema  SKIN: No rashes or lesions, pale      LABS:                        15.1   12.28 )-----------( 236      ( 27 Jul 2020 14:18 )             43.8     07-27    139  |  108  |  25<H>  ----------------------------<  116<H>  4.2   |  27  |  1.40<H>    Ca    9.5      27 Jul 2020 14:18    TPro  7.9  /  Alb  3.9  /  TBili  1.2  /  DBili  x   /  AST  22  /  ALT  20  /  AlkPhos  101  07-27    PT/INR - ( 27 Jul 2020 14:18 )   PT: 11.8 sec;   INR: 1.01 ratio         PTT - ( 27 Jul 2020 14:18 )  PTT:31.5 sec            RADIOLOGY & ADDITIONAL TESTS:

## 2020-07-27 NOTE — ED PROVIDER NOTE - PHYSICAL EXAMINATION
GENERAL: patient appears well, no acute distress, appropriate, pleasant  EYES: sclera clear, no exudates  ENMT: moist mucous membranes  NECK: supple  LUNGS: good air entry bilaterally, clear to auscultation, symmetric breath sounds, no wheezing or rhonchi appreciated  HEART: soft S1/S2, regular rate and rhythm, no murmurs noted, no lower extremity edema  GASTROINTESTINAL: abdomen is soft, nontender, nondistended,   INTEGUMENT:  warm skin, appears well perfused  MUSCULOSKELETAL: L leg externally rotated, no obvious deformity, mild tenderness to palpation of L lower back, limited ROM L LE side due to pain, normal ROM R LE  NEUROLOGIC: awake, alert, oriented x3  PSYCHIATRIC: mood is good, affect is congruent, linear and logical thought process

## 2020-07-27 NOTE — CONSULT NOTE ADULT - ASSESSMENT
75 yo male PMH HTN, HLD, herniated lumbar disc presents to ED s/p fall found to have acute mildly displaced left femoral neck fracture. Plan for OR for operative fixation left hip with hemiarthroplasty. Cardiology consulted for Cardiac optimization pre/post op.     - Patient has no h/o CAD. No active cardiac condition. Has h/o HTN. Slightly hypertensive at present BP: 164/91 possibly 2/2 pain   - Denies chest pain, palpitation, SOB, syncope, dizziness, lightheadedness, orthopnea, PND, PONCE and edema  - No past TTE on file  - EKG demonstrates NSR. No acute ischemic changes noted.  - Patient euvolemic on examination with no overt signs of heart failure or cardiac ischemia.   - No severe valvular abnormalities noted on examination  - No additional cardiac work-up is necessary.   - There is no cardiac contraindication to proceeding with the planned surgery.   - Routine hemodynamic monitoring recommended     - All other medical needs as per primary team.  - Other cardiovascular workup will depend on clinical course.  - Will continue to follow.      Etta Navas, MS FNP, AGAP  Nurse Practitioner- Cardiology   Spectra #[phone] 75 yo male PMH HTN, HLD, rapid HR (on Cardizem, no AC) herniated lumbar disc presents to ED s/p fall found to have acute mildly displaced left femoral neck fracture. Plan for OR for operative fixation left hip with hemiarthroplasty. Cardiology consulted for Cardiac optimization pre/post op.     - Patient has no h/o CAD. No active cardiac condition. Has h/o HTN. Slightly hypertensive at present BP: 164/91 possibly 2/2 pain. Also has h/o rapid HR, on Cardizem at home    - Denies chest pain, palpitation, SOB, syncope, dizziness, lightheadedness, orthopnea, PND, PONCE and edema  - No past TTE on file. Unremarkable ECHO and stress last year per patient   - EKG demonstrates NSR. No acute ischemic changes noted.  - Patient euvolemic on examination with no overt signs of heart failure or cardiac ischemia.   - No severe valvular abnormalities noted on examination  - Patient ambulated with cane, not very active 2/2 DDD Lumbar   - No additional cardiac work-up is necessary.   - Can continue Cardizem, enalapril and Lipitor   - There is no cardiac contraindication to proceeding with the planned surgery.   - Creatinine, Serum: 1.40 mg/dL. Monitor creatinine     - Routine hemodynamic monitoring recommended     - All other medical needs as per primary team.  - Other cardiovascular workup will depend on clinical course.  - Will continue to follow.      MS KLEBER MottP, [facility]  Nurse Practitioner- Cardiology   Spectra #[phone] 73 yo male PMH HTN, HLD, rapid HR (on Cardizem, no AC) herniated lumbar disc presents to ED s/p fall found to have acute mildly displaced left femoral neck fracture. Plan for OR for operative fixation left hip with hemiarthroplasty. Cardiology consulted for Cardiac optimization pre/post op.     - Patient has no h/o CAD. No active cardiac condition. Has h/o HTN. Slightly hypertensive at present BP: 164/91 possibly 2/2 pain. Also has h/o rapid HR, on Cardizem at home    - Denies chest pain, palpitation, SOB, syncope, dizziness, lightheadedness, orthopnea, PND, PONCE and edema  - No past TTE on file. Unremarkable ECHO and stress last year per patient   - EKG demonstrates NSR. No acute ischemic changes noted.  - Patient euvolemic on examination with no overt signs of heart failure or cardiac ischemia.   - No severe valvular abnormalities noted on examination  - Patient ambulated with cane, not very active 2/2 DDD Lumbar   - No additional cardiac work-up is necessary.   - Can continue Cardizem, and Lipitor   - Creatinine, Serum: 1.40 mg/dL. Monitor creatinine   - Can hols enalapril in the setting of DONA. Resume when DONA resolves  - There is no cardiac contraindication to proceeding with the planned surgery.   - Routine hemodynamic monitoring recommended     - All other medical needs as per primary team.  - Other cardiovascular workup will depend on clinical course.  - Will continue to follow.      MS KLEBER MottP, [facility]  Nurse Practitioner- Cardiology   Spectra #[phone] 75 yo male PMH HTN, HLD, rapid HR (on Cardizem, no AC) herniated lumbar disc presents to ED s/p fall found to have acute mildly displaced left femoral neck fracture. Plan for OR for operative fixation left hip with hemiarthroplasty. Cardiology consulted for Cardiac optimization pre/post op.     - Patient has no h/o CAD. No active cardiac condition. Has h/o HTN. Slightly hypertensive at present BP: 164/91 possibly 2/2 pain. Also has h/o rapid HR, on Cardizem at home    - Denies chest pain, palpitation, SOB, syncope, dizziness, lightheadedness, orthopnea, PND, PONCE and edema  - No past TTE on file. Unremarkable ECHO and stress last year per patient   - EKG demonstrates NSR, long 1HB. No acute ischemic changes noted.  - Patient euvolemic on examination with no overt signs of heart failure or cardiac ischemia.   - No severe valvular abnormalities noted on examination  - Patient ambulated with cane, not very active 2/2 DDD Lumbar   - No additional cardiac work-up is necessary.   - Can continue Cardizem, and Lipitor   - Creatinine, Serum: 1.40 mg/dL. Monitor creatinine   - Can hols enalapril in the setting of DONA. Resume when DONA resolves  - There is no cardiac contraindication to proceeding with the planned surgery.   - Routine hemodynamic monitoring recommended     - All other medical needs as per primary team.  - Other cardiovascular workup will depend on clinical course.  - Will continue to follow.      Etta Navas MS FNP, [facility]  Nurse Practitioner- Cardiology   Spectra #[phone] 75 yo male PMH HTN, HLD, rapid HR (on Cardizem, no AC) herniated lumbar disc presents to ED s/p fall found to have acute mildly displaced left femoral neck fracture. Plan for OR for operative fixation left hip with hemiarthroplasty. Cardiology consulted for Cardiac optimization pre/post op.     - Patient has no h/o CAD. No active cardiac condition. Has h/o HTN. Slightly hypertensive at present BP: 164/91 possibly 2/2 pain. Also has h/o rapid HR, on Cardizem at home    - Denies chest pain, palpitation, SOB, syncope, dizziness, lightheadedness, orthopnea, PND, PONCE and edema  - No past TTE on file. Unremarkable ECHO and stress last year per patient   - EKG demonstrates NSR, long 1HB. No acute ischemic changes noted.  - Patient euvolemic on examination with no overt signs of heart failure or cardiac ischemia.   - No severe valvular abnormalities noted on examination  - Patient ambulated with cane, not very active 2/2 DDD Lumbar   - No additional cardiac work-up is necessary.   - Can continue Cardizem, and Lipitor   - Creatinine, Serum: 1.40 mg/dL. Monitor creatinine   - Can hold enalapril in the setting of DONA. Resume when DONA resolves  - There is no cardiac contraindication to proceeding with the planned surgery.   - Routine hemodynamic monitoring recommended     - All other medical needs as per primary team.  - Other cardiovascular workup will depend on clinical course.  - Will continue to follow.      Etta Navas MS FNP, [facility]  Nurse Practitioner- Cardiology   Spectra #[phone]

## 2020-07-27 NOTE — CONSULT NOTE ADULT - SUBJECTIVE AND OBJECTIVE BOX
*******This is an incomplete note********      74y Male presents to [facility] s/p fall last night c/o severe L hip pain and inability to ambulate.  Patient denies headstrike or LOC. Localizes pain to L hip/femur. Patient reports radiation of pain down the left leg. Also notes to have chronic back pain and occasionally left radiating leg pain and weakness which may have precipitated his fall. Patient denies numbness/tingling/burning in the LLE. No other bone/joint complaints. Patient is a community ambulator at baseline without assistive devices. Patient has no issues w/ ADLs/IADLs.     PAST MEDICAL & SURGICAL HISTORY:  Hyperlipidemia  Hypertension  Hyperlipidemia  Hypertension  H/O hernia repair    MEDICATIONS  (STANDING):  ketorolac   Injectable 15 milliGRAM(s) IV Push Once    MEDICATIONS  (PRN):    Allergies  No Known Allergies    T(C): 36.6 (07-27-20 @ 12:25), Max: 36.6 (07-27-20 @ 11:55)  HR: 86 (07-27-20 @ 12:25) (86 - 86)  BP: 164/91 (07-27-20 @ 12:25) (164/91 - 164/91)  RR: 18 (07-27-20 @ 12:25) (18 - 18)  SpO2: 96% (07-27-20 @ 12:25) (96% - 96%)  Wt(kg): --    PE   LLE:  Skin intact; No ecchymosis/soft tissue swelling  Compartments soft; + TTP about hip. No TTP to knee/leg/ankle/foot   ROM limited 2/2 pain   Unable to SLR; + Log Roll/Heel Strike  Motor intact GS/TA/FHL/EHL  SILT L2-S1  DP/PT pulses 2+    RLE:  Able to SLR; neg Log Roll/Heel Strike  5/5 Motor intact GS/TA/FHL/EHL  SILT L2-S1  DP/PT pulses 2+    BUE:   No bony TTP; Good ROM w/o pain; Exam Unremarkable    Imaging:  XR demonstrating a displaced left femoral neck fracture.   XR LSp showing degenerative grade 1 spondy at L3-4, no obvious fracture or malalignment.   CT LSp showing degenerative disc change with vacuum at L4-5 with moderate change at L3-L4. There is grade 1 degenerative spondylolisthesis at L3-L4. There is mild spinal stenosis at L4-5 and moderate stenosis at L3-L4.       74M with degenerative spondylolithesis and a displaced left femoral neck fracture    - Patient with above diagnosis, will require operative fixation for optimal outcome of left hip with hemiarthroplasty.   - Pain control  - NPO/IVF  - CBC/BMP/Coags/UA/T+S x2  - EKG/CXR  - COVID test  - Medical clearance for possible OR today   - Will discuss with attending  - *******This is an incomplete note******** 74y Male presents to  ED s/p fall last night c/o severe L hip pain and inability to ambulate.  Patient denies headstrike or LOC. Localizes pain to L hip/femur. Patient reports radiation of pain down the left leg. Also notes to have chronic back pain and occasionally left radiating leg pain and weakness which may have precipitated his fall. Patient denies numbness/tingling/burning in the LLE. No other bone/joint complaints. Patient is a community ambulator at baseline reports utilizing a cane for ambulation. Patient has no issues w/ ADLs/IADLs.     PAST MEDICAL & SURGICAL HISTORY:  Hyperlipidemia  Hypertension  Hyperlipidemia  Hypertension  H/O hernia repair    MEDICATIONS  (STANDING):  ketorolac   Injectable 15 milliGRAM(s) IV Push Once    MEDICATIONS  (PRN):    Allergies  No Known Allergies    Vital Signs Last 24 Hrs  T(C): 36.6 (27 Jul 2020 12:25), Max: 36.6 (27 Jul 2020 11:55)  T(F): 97.8 (27 Jul 2020 12:25), Max: 97.8 (27 Jul 2020 11:55)  HR: 86 (27 Jul 2020 12:25) (86 - 86)  BP: 164/91 (27 Jul 2020 12:25) (164/91 - 164/91)  BP(mean): --  RR: 18 (27 Jul 2020 12:25) (18 - 18)  SpO2: 96% (27 Jul 2020 12:25) (96% - 96%)    PE   LLE:  Skin intact; No ecchymosis/soft tissue swelling  Compartments soft; + TTP about hip. No TTP to knee/leg/ankle/foot   ROM limited 2/2 pain   Unable to SLR; + Log Roll/Heel Strike  Motor intact GS/TA/FHL/EHL  SILT L2-S1  DP/PT pulses dopplerable     RLE:  Able to SLR; neg Log Roll/Heel Strike  5/5 Motor intact GS/TA/FHL/EHL  SILT L2-S1  DP/PT pulses dopplerable    BUE:   No bony TTP; Good ROM w/o pain; Exam Unremarkable    Imaging:  XR demonstrating a displaced left femoral neck fracture.   XR LSp showing degenerative grade 1 spondy at L3-4, no obvious fracture or malalignment.   CT LSp showing degenerative disc change with vacuum at L4-5 with moderate change at L3-L4. There is grade 1 degenerative spondylolisthesis at L3-L4. There is mild spinal stenosis at L4-5 and moderate stenosis at L3-L4.

## 2020-07-27 NOTE — CONSULT NOTE ADULT - ATTENDING COMMENTS
I saw and examined the patient personally. Spoke with above provider regarding this case. I reviewed the above findings completely.  I agree with the above history, physical, and plan which I have edited where appropriate.     Currently no active cardiac conditions. No signs of ischemia, ADHF, clinical exam not consistent with severe stenotic valvular disease, no unstable arrhythmias noted. Therefore able to proceed with this intermediate risk urgent/emergent orthopedic surgery without any further cardiac workup. Routine hemodynamic monitoring is suggested during the procedure.

## 2020-07-27 NOTE — ED PROVIDER NOTE - OBJECTIVE STATEMENT
75 yo male PMH HTN, HLD, herniated lumbar disc presents to ED s/p fall on his L side last night around 11:30 pm. Patient states he fell on his L side and injured his L hip and lower back. Patient states he has a history of generalized leg weakness with occasional "leg-buckling." Patient believes that is what caused him to fall. Denies hitting head or LOC. Patient states his son helped him up after the fall and laid him in bed, he has not be able to walk since due to pain. C/o severe leg pain that radiates down his leg. Tried taking Motrin 200 mg with minimal relief. 75 yo male PMH HTN, HLD, herniated lumbar disc presents to ED s/p fall on his L side last night around 11:30 pm. Patient states he fell on his L side and injured his L hip and L lower back with sharp pain that radiates down his leg. Patient states he has a history of generalized leg weakness with occasional "leg-buckling." Patient believes that is what caused him to fall. Denies dizziness or HA at time of fall. Denies hitting head or LOC. Patient states his son helped him up after the fall and laid him in bed, he has not be able to walk or move leg since fall due to pain. Tried taking Motrin 200 mg with minimal relief. Denies incontinence, HA, dizziness, CP, SOB.

## 2020-07-28 ENCOUNTER — TRANSCRIPTION ENCOUNTER (OUTPATIENT)
Age: 75
End: 2020-07-28

## 2020-07-28 ENCOUNTER — RESULT REVIEW (OUTPATIENT)
Age: 75
End: 2020-07-28

## 2020-07-28 LAB
ABO RH CONFIRMATION: SIGNIFICANT CHANGE UP
ANION GAP SERPL CALC-SCNC: 5 MMOL/L — SIGNIFICANT CHANGE UP (ref 5–17)
ANION GAP SERPL CALC-SCNC: 6 MMOL/L — SIGNIFICANT CHANGE UP (ref 5–17)
ANION GAP SERPL CALC-SCNC: 7 MMOL/L — SIGNIFICANT CHANGE UP (ref 5–17)
APPEARANCE UR: ABNORMAL
APTT BLD: 27.8 SEC — SIGNIFICANT CHANGE UP (ref 27.5–35.5)
BACTERIA # UR AUTO: ABNORMAL
BASOPHILS # BLD AUTO: 0.06 K/UL — SIGNIFICANT CHANGE UP (ref 0–0.2)
BASOPHILS NFR BLD AUTO: 0.6 % — SIGNIFICANT CHANGE UP (ref 0–2)
BILIRUB UR-MCNC: NEGATIVE — SIGNIFICANT CHANGE UP
BUN SERPL-MCNC: 25 MG/DL — HIGH (ref 7–23)
BUN SERPL-MCNC: 25 MG/DL — HIGH (ref 7–23)
BUN SERPL-MCNC: 26 MG/DL — HIGH (ref 7–23)
CALCIUM SERPL-MCNC: 8.2 MG/DL — LOW (ref 8.5–10.1)
CALCIUM SERPL-MCNC: 8.6 MG/DL — SIGNIFICANT CHANGE UP (ref 8.5–10.1)
CALCIUM SERPL-MCNC: 8.7 MG/DL — SIGNIFICANT CHANGE UP (ref 8.5–10.1)
CHLORIDE SERPL-SCNC: 109 MMOL/L — HIGH (ref 96–108)
CHLORIDE SERPL-SCNC: 110 MMOL/L — HIGH (ref 96–108)
CHLORIDE SERPL-SCNC: 110 MMOL/L — HIGH (ref 96–108)
CO2 SERPL-SCNC: 24 MMOL/L — SIGNIFICANT CHANGE UP (ref 22–31)
CO2 SERPL-SCNC: 26 MMOL/L — SIGNIFICANT CHANGE UP (ref 22–31)
CO2 SERPL-SCNC: 27 MMOL/L — SIGNIFICANT CHANGE UP (ref 22–31)
COLOR SPEC: YELLOW — SIGNIFICANT CHANGE UP
CREAT ?TM UR-MCNC: 333 MG/DL — SIGNIFICANT CHANGE UP
CREAT SERPL-MCNC: 1.3 MG/DL — SIGNIFICANT CHANGE UP (ref 0.5–1.3)
CREAT SERPL-MCNC: 1.4 MG/DL — HIGH (ref 0.5–1.3)
CREAT SERPL-MCNC: 1.5 MG/DL — HIGH (ref 0.5–1.3)
DIFF PNL FLD: ABNORMAL
EOSINOPHIL # BLD AUTO: 0.01 K/UL — SIGNIFICANT CHANGE UP (ref 0–0.5)
EOSINOPHIL NFR BLD AUTO: 0.1 % — SIGNIFICANT CHANGE UP (ref 0–6)
EPI CELLS # UR: SIGNIFICANT CHANGE UP
GLUCOSE SERPL-MCNC: 106 MG/DL — HIGH (ref 70–99)
GLUCOSE SERPL-MCNC: 113 MG/DL — HIGH (ref 70–99)
GLUCOSE SERPL-MCNC: 130 MG/DL — HIGH (ref 70–99)
GLUCOSE UR QL: NEGATIVE — SIGNIFICANT CHANGE UP
HCT VFR BLD CALC: 34.8 % — LOW (ref 39–50)
HCT VFR BLD CALC: 38.4 % — LOW (ref 39–50)
HCT VFR BLD CALC: 40.1 % — SIGNIFICANT CHANGE UP (ref 39–50)
HGB BLD-MCNC: 11.5 G/DL — LOW (ref 13–17)
HGB BLD-MCNC: 13 G/DL — SIGNIFICANT CHANGE UP (ref 13–17)
HGB BLD-MCNC: 13.5 G/DL — SIGNIFICANT CHANGE UP (ref 13–17)
IMM GRANULOCYTES NFR BLD AUTO: 0.5 % — SIGNIFICANT CHANGE UP (ref 0–1.5)
INR BLD: 1.06 RATIO — SIGNIFICANT CHANGE UP (ref 0.88–1.16)
KETONES UR-MCNC: ABNORMAL
LEUKOCYTE ESTERASE UR-ACNC: NEGATIVE — SIGNIFICANT CHANGE UP
LYMPHOCYTES # BLD AUTO: 1.03 K/UL — SIGNIFICANT CHANGE UP (ref 1–3.3)
LYMPHOCYTES # BLD AUTO: 9.8 % — LOW (ref 13–44)
MCHC RBC-ENTMCNC: 30.8 PG — SIGNIFICANT CHANGE UP (ref 27–34)
MCHC RBC-ENTMCNC: 30.9 PG — SIGNIFICANT CHANGE UP (ref 27–34)
MCHC RBC-ENTMCNC: 31 PG — SIGNIFICANT CHANGE UP (ref 27–34)
MCHC RBC-ENTMCNC: 33 GM/DL — SIGNIFICANT CHANGE UP (ref 32–36)
MCHC RBC-ENTMCNC: 33.7 GM/DL — SIGNIFICANT CHANGE UP (ref 32–36)
MCHC RBC-ENTMCNC: 33.9 GM/DL — SIGNIFICANT CHANGE UP (ref 32–36)
MCV RBC AUTO: 91.6 FL — SIGNIFICANT CHANGE UP (ref 80–100)
MCV RBC AUTO: 91.6 FL — SIGNIFICANT CHANGE UP (ref 80–100)
MCV RBC AUTO: 93.5 FL — SIGNIFICANT CHANGE UP (ref 80–100)
MONOCYTES # BLD AUTO: 0.78 K/UL — SIGNIFICANT CHANGE UP (ref 0–0.9)
MONOCYTES NFR BLD AUTO: 7.4 % — SIGNIFICANT CHANGE UP (ref 2–14)
NEUTROPHILS # BLD AUTO: 8.63 K/UL — HIGH (ref 1.8–7.4)
NEUTROPHILS NFR BLD AUTO: 81.6 % — HIGH (ref 43–77)
NITRITE UR-MCNC: NEGATIVE — SIGNIFICANT CHANGE UP
NRBC # BLD: 0 /100 WBCS — SIGNIFICANT CHANGE UP (ref 0–0)
PH UR: 5 — SIGNIFICANT CHANGE UP (ref 5–8)
PLATELET # BLD AUTO: 160 K/UL — SIGNIFICANT CHANGE UP (ref 150–400)
PLATELET # BLD AUTO: 204 K/UL — SIGNIFICANT CHANGE UP (ref 150–400)
PLATELET # BLD AUTO: 213 K/UL — SIGNIFICANT CHANGE UP (ref 150–400)
POTASSIUM SERPL-MCNC: 4 MMOL/L — SIGNIFICANT CHANGE UP (ref 3.5–5.3)
POTASSIUM SERPL-MCNC: 4.3 MMOL/L — SIGNIFICANT CHANGE UP (ref 3.5–5.3)
POTASSIUM SERPL-MCNC: 4.7 MMOL/L — SIGNIFICANT CHANGE UP (ref 3.5–5.3)
POTASSIUM SERPL-SCNC: 4 MMOL/L — SIGNIFICANT CHANGE UP (ref 3.5–5.3)
POTASSIUM SERPL-SCNC: 4.3 MMOL/L — SIGNIFICANT CHANGE UP (ref 3.5–5.3)
POTASSIUM SERPL-SCNC: 4.7 MMOL/L — SIGNIFICANT CHANGE UP (ref 3.5–5.3)
PROT UR-MCNC: 30 MG/DL
PROTHROM AB SERPL-ACNC: 12.4 SEC — SIGNIFICANT CHANGE UP (ref 10.6–13.6)
RBC # BLD: 3.72 M/UL — LOW (ref 4.2–5.8)
RBC # BLD: 4.19 M/UL — LOW (ref 4.2–5.8)
RBC # BLD: 4.38 M/UL — SIGNIFICANT CHANGE UP (ref 4.2–5.8)
RBC # FLD: 13.1 % — SIGNIFICANT CHANGE UP (ref 10.3–14.5)
RBC # FLD: 13.2 % — SIGNIFICANT CHANGE UP (ref 10.3–14.5)
RBC # FLD: 13.2 % — SIGNIFICANT CHANGE UP (ref 10.3–14.5)
RBC CASTS # UR COMP ASSIST: ABNORMAL /HPF (ref 0–4)
SARS-COV-2 IGG SERPL QL IA: NEGATIVE — SIGNIFICANT CHANGE UP
SARS-COV-2 IGM SERPL IA-ACNC: 0.08 INDEX — SIGNIFICANT CHANGE UP
SODIUM SERPL-SCNC: 140 MMOL/L — SIGNIFICANT CHANGE UP (ref 135–145)
SODIUM SERPL-SCNC: 141 MMOL/L — SIGNIFICANT CHANGE UP (ref 135–145)
SODIUM SERPL-SCNC: 143 MMOL/L — SIGNIFICANT CHANGE UP (ref 135–145)
SODIUM UR-SCNC: 49 MMOL/L — SIGNIFICANT CHANGE UP
SP GR SPEC: 1.02 — SIGNIFICANT CHANGE UP (ref 1.01–1.02)
UROBILINOGEN FLD QL: 1
UUN UR-MCNC: 1528 MG/DL — SIGNIFICANT CHANGE UP
WBC # BLD: 10.06 K/UL — SIGNIFICANT CHANGE UP (ref 3.8–10.5)
WBC # BLD: 10.56 K/UL — HIGH (ref 3.8–10.5)
WBC # BLD: 13.18 K/UL — HIGH (ref 3.8–10.5)
WBC # FLD AUTO: 10.06 K/UL — SIGNIFICANT CHANGE UP (ref 3.8–10.5)
WBC # FLD AUTO: 10.56 K/UL — HIGH (ref 3.8–10.5)
WBC # FLD AUTO: 13.18 K/UL — HIGH (ref 3.8–10.5)
WBC UR QL: NEGATIVE — SIGNIFICANT CHANGE UP

## 2020-07-28 PROCEDURE — 99232 SBSQ HOSP IP/OBS MODERATE 35: CPT

## 2020-07-28 PROCEDURE — 73502 X-RAY EXAM HIP UNI 2-3 VIEWS: CPT | Mod: 26,LT

## 2020-07-28 PROCEDURE — 88305 TISSUE EXAM BY PATHOLOGIST: CPT | Mod: 26

## 2020-07-28 PROCEDURE — 88311 DECALCIFY TISSUE: CPT | Mod: 26

## 2020-07-28 RX ORDER — PANTOPRAZOLE SODIUM 20 MG/1
40 TABLET, DELAYED RELEASE ORAL
Refills: 0 | Status: DISCONTINUED | OUTPATIENT
Start: 2020-07-28 | End: 2020-07-31

## 2020-07-28 RX ORDER — BUPIVACAINE 13.3 MG/ML
20 INJECTION, SUSPENSION, LIPOSOMAL INFILTRATION ONCE
Refills: 0 | Status: DISCONTINUED | OUTPATIENT
Start: 2020-07-28 | End: 2020-07-31

## 2020-07-28 RX ORDER — ONDANSETRON 8 MG/1
4 TABLET, FILM COATED ORAL EVERY 6 HOURS
Refills: 0 | Status: DISCONTINUED | OUTPATIENT
Start: 2020-07-28 | End: 2020-07-31

## 2020-07-28 RX ORDER — ENOXAPARIN SODIUM 100 MG/ML
40 INJECTION SUBCUTANEOUS EVERY 24 HOURS
Refills: 0 | Status: DISCONTINUED | OUTPATIENT
Start: 2020-07-29 | End: 2020-07-31

## 2020-07-28 RX ORDER — SODIUM CHLORIDE 9 MG/ML
1000 INJECTION, SOLUTION INTRAVENOUS
Refills: 0 | Status: DISCONTINUED | OUTPATIENT
Start: 2020-07-28 | End: 2020-07-28

## 2020-07-28 RX ORDER — OXYCODONE HYDROCHLORIDE 5 MG/1
5 TABLET ORAL ONCE
Refills: 0 | Status: DISCONTINUED | OUTPATIENT
Start: 2020-07-28 | End: 2020-07-28

## 2020-07-28 RX ORDER — OXYCODONE HYDROCHLORIDE 5 MG/1
10 TABLET ORAL EVERY 4 HOURS
Refills: 0 | Status: DISCONTINUED | OUTPATIENT
Start: 2020-07-28 | End: 2020-07-29

## 2020-07-28 RX ORDER — CEFAZOLIN SODIUM 1 G
2000 VIAL (EA) INJECTION EVERY 8 HOURS
Refills: 0 | Status: COMPLETED | OUTPATIENT
Start: 2020-07-29 | End: 2020-07-29

## 2020-07-28 RX ORDER — ASPIRIN/CALCIUM CARB/MAGNESIUM 324 MG
81 TABLET ORAL DAILY
Refills: 0 | Status: DISCONTINUED | OUTPATIENT
Start: 2020-07-28 | End: 2020-07-31

## 2020-07-28 RX ORDER — ACETAMINOPHEN 500 MG
650 TABLET ORAL EVERY 6 HOURS
Refills: 0 | Status: DISCONTINUED | OUTPATIENT
Start: 2020-07-28 | End: 2020-07-31

## 2020-07-28 RX ORDER — BENZOCAINE AND MENTHOL 5; 1 G/100ML; G/100ML
1 LIQUID ORAL ONCE
Refills: 0 | Status: COMPLETED | OUTPATIENT
Start: 2020-07-28 | End: 2020-07-28

## 2020-07-28 RX ORDER — HYDROMORPHONE HYDROCHLORIDE 2 MG/ML
0.5 INJECTION INTRAMUSCULAR; INTRAVENOUS; SUBCUTANEOUS
Refills: 0 | Status: DISCONTINUED | OUTPATIENT
Start: 2020-07-28 | End: 2020-07-28

## 2020-07-28 RX ORDER — SODIUM CHLORIDE 9 MG/ML
1000 INJECTION, SOLUTION INTRAVENOUS
Refills: 0 | Status: DISCONTINUED | OUTPATIENT
Start: 2020-07-28 | End: 2020-07-29

## 2020-07-28 RX ORDER — ONDANSETRON 8 MG/1
4 TABLET, FILM COATED ORAL ONCE
Refills: 0 | Status: DISCONTINUED | OUTPATIENT
Start: 2020-07-28 | End: 2020-07-28

## 2020-07-28 RX ORDER — MORPHINE SULFATE 50 MG/1
2 CAPSULE, EXTENDED RELEASE ORAL
Refills: 0 | Status: DISCONTINUED | OUTPATIENT
Start: 2020-07-28 | End: 2020-07-29

## 2020-07-28 RX ORDER — ATORVASTATIN CALCIUM 80 MG/1
20 TABLET, FILM COATED ORAL AT BEDTIME
Refills: 0 | Status: DISCONTINUED | OUTPATIENT
Start: 2020-07-28 | End: 2020-07-31

## 2020-07-28 RX ORDER — POLYETHYLENE GLYCOL 3350 17 G/17G
17 POWDER, FOR SOLUTION ORAL DAILY
Refills: 0 | Status: DISCONTINUED | OUTPATIENT
Start: 2020-07-28 | End: 2020-07-31

## 2020-07-28 RX ORDER — HYDRALAZINE HCL 50 MG
5 TABLET ORAL EVERY 8 HOURS
Refills: 0 | Status: DISCONTINUED | OUTPATIENT
Start: 2020-07-28 | End: 2020-07-29

## 2020-07-28 RX ORDER — CEFAZOLIN SODIUM 1 G
2000 VIAL (EA) INJECTION EVERY 8 HOURS
Refills: 0 | Status: DISCONTINUED | OUTPATIENT
Start: 2020-07-28 | End: 2020-07-28

## 2020-07-28 RX ORDER — OXYCODONE HYDROCHLORIDE 5 MG/1
5 TABLET ORAL EVERY 4 HOURS
Refills: 0 | Status: DISCONTINUED | OUTPATIENT
Start: 2020-07-28 | End: 2020-07-29

## 2020-07-28 RX ORDER — SENNA PLUS 8.6 MG/1
2 TABLET ORAL AT BEDTIME
Refills: 0 | Status: DISCONTINUED | OUTPATIENT
Start: 2020-07-28 | End: 2020-07-31

## 2020-07-28 RX ORDER — DILTIAZEM HCL 120 MG
120 CAPSULE, EXT RELEASE 24 HR ORAL DAILY
Refills: 0 | Status: DISCONTINUED | OUTPATIENT
Start: 2020-07-28 | End: 2020-07-31

## 2020-07-28 RX ADMIN — TRAMADOL HYDROCHLORIDE 50 MILLIGRAM(S): 50 TABLET ORAL at 05:40

## 2020-07-28 RX ADMIN — SODIUM CHLORIDE 75 MILLILITER(S): 9 INJECTION, SOLUTION INTRAVENOUS at 22:17

## 2020-07-28 RX ADMIN — TRAMADOL HYDROCHLORIDE 50 MILLIGRAM(S): 50 TABLET ORAL at 04:39

## 2020-07-28 RX ADMIN — Medication 5 MILLIGRAM(S): at 22:15

## 2020-07-28 RX ADMIN — TRAMADOL HYDROCHLORIDE 50 MILLIGRAM(S): 50 TABLET ORAL at 12:03

## 2020-07-28 RX ADMIN — TRAMADOL HYDROCHLORIDE 50 MILLIGRAM(S): 50 TABLET ORAL at 13:00

## 2020-07-28 RX ADMIN — Medication 81 MILLIGRAM(S): at 11:11

## 2020-07-28 RX ADMIN — Medication 120 MILLIGRAM(S): at 07:06

## 2020-07-28 RX ADMIN — BENZOCAINE AND MENTHOL 1 LOZENGE: 5; 1 LIQUID ORAL at 11:11

## 2020-07-28 RX ADMIN — Medication 100 MILLIGRAM(S): at 17:10

## 2020-07-28 NOTE — PROGRESS NOTE ADULT - ASSESSMENT
73 yo male PMH HTN, HLD, rapid HR (on Cardizem, no AC) herniated lumbar disc presents to ED s/p fall found to have acute mildly displaced left femoral neck fracture. Plan for OR for operative fixation left hip with hemiarthroplasty. Cardiology consulted for Cardiac optimization pre/post op.     pre op cardiac optimization   - Denies chest pain, palpitation, SOB, syncope, dizziness, lightheadedness, orthopnea, PND, PONCE and edema  - No past TTE on file. Unremarkable ECHO and stress last year per patient   - EKG demonstrates NSR, long 1HB. No acute ischemic changes noted.  - Patient euvolemic on examination with no overt signs of heart failure or cardiac ischemia.   - No severe valvular abnormalities noted on examination    HTN  - Continue cardizem  -resume ace, was held on admission for DONA now resolved    htn  continue statin       - There is no cardiac contraindication to proceeding with the planned surgery  -can hold ASA, resume post procedure when okay with ortho   - Routine hemodynamic monitoring recommended     - All other medical needs as per primary team.  - Other cardiovascular workup will depend on clinical course.  - Will continue to follow.    India Can FNP-C  Cardiology NP  [id number] [phone]

## 2020-07-28 NOTE — PROGRESS NOTE ADULT - SUBJECTIVE AND OBJECTIVE BOX
Patient is a 74y old  Male who presents with a chief complaint of L Femoral neck fracture (2020 18:23)       HPI:  Pt is a 75 yo M presenting from home after a fall resulting in L Femoral neck fracture. PMH HTN, HLD, "fast" HR.   Pt seen and examined at bedside.   He states that last night he was walking when all of a sudden his L leg buckled at the knee causing him to fall. He denies any head traume or LOC. He states that his leg simply gave out. He denies any assoc focal leg weakness, numbness or tingling. He has L hip pain, worse with movement, sharp, currently 4/10 in severity. No radiation elsewhere. He has no other complaints at this time.   Denies past hx of MI/TIA/CVA/CHF/DM2. He has never had any adverse reaction to anesthesia. no family hx of adverse reactions to anesthesia. He has no other complaints at this time. He can ambulate 2 blocks without associated chest pain or SOB.   Of note he was noted to have DONA and reports having taken Motrin for the last 2 days every 4 hours for pain.  Thinks he was taking 200mg tabs, but not sure.  He has never seen a nephrologist or been told of any issues with his kidneys.  He states he has had much PO intake today. Denies dysuria.  States he has some minor difficulty initiating his stream today which he has not had in the past, but felt he was able to void adequately.       Follow up DONA  No complaints this morning    PAST MEDICAL & SURGICAL HISTORY:  Hyperlipidemia  Hypertension  Hyperlipidemia  Hypertension  H/O hernia repair       FAMILY HISTORY:  [place]    Social History:Non smoker    MEDICATIONS  (STANDING):  aspirin  chewable 81 milliGRAM(s) Oral daily  atorvastatin 20 milliGRAM(s) Oral at bedtime  diltiazem    milliGRAM(s) Oral daily  sodium chloride 0.9%. 1000 milliLiter(s) (75 mL/Hr) IV Continuous <Continuous>    MEDICATIONS  (PRN):  acetaminophen   Tablet .. 650 milliGRAM(s) Oral every 6 hours PRN Mild Pain (1 - 3)  oxyCODONE    IR 10 milliGRAM(s) Oral every 6 hours PRN Severe Pain (7 - 10)  traMADol 50 milliGRAM(s) Oral every 6 hours PRN Moderate Pain (4 - 6)   Meds reviewed    Allergies    No Known Allergies    Intolerances         REVIEW OF SYSTEMS:    CONSTITUTIONAL:  No weight loss   EYES: No eye pain, visual disturbances, or discharge  ENMT:  No difficulty hearing, tinnitus, vertigo; No sinus or throat pain  NECK: No pain or stiffness  BREASTS: No pain, masses, or nipple discharge  RESPIRATORY: No SOB. No wheeze. No PONCE  CARDIOVASCULAR: No chest pain, palpitations, dizziness,   GASTROINTESTINAL: No abdominal or epigastric pain. No nausea, vomiting, or hematemesis; No diarrhea or constipation.   GENITOURINARY: No dysuria, frequency, hematuria, or incontinence  NEUROLOGICAL: No headaches, memory loss, loss of strength, numbness, or tremors  SKIN: No Diffuse erythema, no blisters  LYMPH NODES: No enlarged glands  ENDOCRINE: No heat or cold intolerance; No hair loss  MUSCULOSKELETAL: No joint pain or swelling   PSYCHIATRIC: No depression, anxiety, mood swings, or difficulty sleeping  HEME/LYMPH: No easy bruising, or bleeding gums  ALLERGY AND IMMUNOLOGIC: No hives or eczema      Vital Signs Last 24 Hrs  T(C): 37.7 (2020 19:41), Max: 37.7 (2020 19:41)  T(F): 99.8 (2020 19:41), Max: 99.8 (2020 19:41)  HR: 80 (2020 19:41) (80 - 86)  BP: 160/90 (2020 19:41) (160/90 - 164/91)  BP(mean): --  RR: 16 (2020 19:41) (16 - 18)  SpO2: 96% (2020 19:41) (96% - 96%)  Daily Height in cm: 172.72 (2020 12:25)    Daily     PHYSICAL EXAM:    GENERAL: No distress  HEAD:  Atraumatic, Normocephalic  EYES: EOMI,  conjunctiva and sclera clear  ENMT: No drainage from nares, no drainage from pinna  NECK: Supple, neck veins full  NERVOUS SYSTEM:  Alert & Oriented X3, Good concentration; Motor Strength wnl upper and lower extremities  CHEST/LUNG: Clear to percussion bilaterally; No rales, rhonchi, wheezing, or rubs  HEART: Regular rate and rhythm; No murmurs, rubs, or gallops  ABDOMEN: Soft, Nontender, Nondistended; Bowel sounds present, obese  EXTREMITIES:  L ankle Edema  SKIN: No rashes or lesions      LABS:                       13.0   10.56 )-----------( 204      ( 2020 09:12 )             38.4     07-28    141  |  110<H>  |  25<H>  ----------------------------<  106<H>  4.3   |  26  |  1.30    Ca    8.6      2020 09:12    TPro  7.9  /  Alb  3.9  /  TBili  1.2  /  DBili  x   /  AST  22  /  ALT  20  /  AlkPhos  101  07-27    PT/INR - ( 2020 04:32 )   PT: 12.4 sec;   INR: 1.06 ratio         PTT - ( 2020 04:32 )  PTT:27.8 sec  Urinalysis Basic - ( 2020 08:24 )    Color: Yellow / Appearance: Slightly Turbid / S.025 / pH: x  Gluc: x / Ketone: Small  / Bili: Negative / Urobili: 1   Blood: x / Protein: 30 mg/dL / Nitrite: Negative   Leuk Esterase: Negative / RBC: 6-10 /HPF / WBC Negative   Sq Epi: x / Non Sq Epi: Few / Bacteria: Few              RADIOLOGY & ADDITIONAL TESTS:

## 2020-07-28 NOTE — DISCHARGE NOTE PROVIDER - NSDCCAREPROVSEEN_GEN_ALL_CORE_FT
Patient Active Problem List    Diagnosis Date Noted   • Encounter for antineoplastic immunotherapy 01/30/2014     Priority: High   • Melanoma of foot, left (CMS/HCC) 01/21/2014     Priority: High     Overview Note:     Left foot - Breslow 5.25, mitch's level IV, ulcerated, 5 mitosis per square millimeter, lymphovascular invasion, 2 positive sentinel lymph nodes (12/12/13). Lymph node dissection on January 8, 2014 with 1/10 positive lymph nodes.  Rx on clinical trial ECOG  (Ipilumumab 3 mg per kilogram arm). 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 Off therapy per protocol  May 2014. Left foot recurrence  June 2014. GI bleed and new duodenal mass, hypophysitis with low ACTH  Recurrent disease with hayden metastases. BRAF wild.  October 2014. Rx with pembrolizumab initiated  July 2015. CR on PET CT scan following 12 cycles of pembrolizumab         • Adrenal insufficiency (CMS/HCC) 06/11/2014     Priority: Medium     Overview Note:     Secondary to therapy with Ipilumumab     • Hypertension 12/02/2013     Priority: Medium   • Cardiomyopathy, nonischemic (CMS/HCC) 09/16/2013     Priority: Medium   • JIM (obstructive sleep apnea) 05/13/2016     Priority: Low     Overview Note:     AUTO CPAP 8-16 CM; ELDER AT HOME     • Current use of long term anticoagulation- rivaroxaban (Xarelto) 08/03/2015     Priority: Low   • Anophthalmos of right eye 07/20/2015     Priority: Low   • Secondary hypothyroidism 08/17/2014     Priority: Low   • Atrial fibrillation, permanent on rate control 08/23/2013     Priority: Low     Overview Note:     Found incidentally, asymptomatic  Stress myocardial perfusion study normal, ejection fraction 35% on May 23, 2013.  Obstructive sleep apnea  Treating with a rate control strategy           HISTORY OF PRESENT ILLNESS:  Cameron in his wife return to the office in follow-up of his metastatic malignant melanoma of the left foot currently MARTHA following treatment with pembrolizumab. He continues  to be on treatment with pembrolizumab without any significant changes. He is main complain is of urgency and frequency secondary to chronic bladder irritation. His other multiple medical problems are stable. He states that he has felt better being off Xarelto for a few days and wonders if switching anticoagulation may help with his symptoms. He continues to follow with endocrinology for his multiple endocrinopathies associated with immunotherapy (Ipilumumab).    PAST MEDICAL HISTORY AND PAST SURGICAL HISTORY:  Reviewed and interval changes as noted above.    ALLERGIES AND DRUG INTOLERANCE:  Reviewed and interval changes as noted above    CURRENT MEDICATIONS:  Reviewed and interval changes as noted above    FAMILY HISTORY AND SOCIAL HISTORY:  Reviewed and interval changes as noted above.    REVIEW OF SYSTEMS:  Per electronic medical record notes, reviewed and agreed upon.    PHYSICAL EXAMINATION:  Oncology Encounter Vitals [07/12/17 1121]   ONC OP Encounter Vitals Group      /62      Pulse 65      Resp 16      Temp 97.8 °F (36.6 °C)      Temp src Temporal Art      SpO2       Weight (!) 311 lb 1.1 oz (141.1 kg)      Height 5' 11\" (1.803 m)      Pain Score  0      Pain Location       Pain Education?       BSA (Calculated - m2) - [employer] 2.54      BMI (Calculated) 43.48       ECOG Performance Status   1 - No physically strenuous activity, but ambulatory and able to carry out light or sedentary work.     General Appearance: 69 year old male in no acute distress.  Psychiatric: Mood and affect are normal.  Judgment and insight are appropriate.  HEENT:  Head: Normocephalic, atraumatic.  Mouth: No oral lesions.   Lymphatic: No pathological adenopathy palpated.  Cardiovascular:  No JVD(jugular venous distention).  Heart had a irregular rhythm and rate with no gallops rubs or murmurs noted.  Respiratory:  Normal respiratory effort, bilateral and symmetric expansion. Lungs are clear to auscultation  bilaterally.  Abdomen: Obese. No masses or ascites, soft and non-tender, no hepatosplenomegaly. Normal bowel sounds, no rebound tenderness.   Musculoskeletal: Extremities without clubbing, cyanosis or edema. No tenderness to palpation of the spine.   Skin: No rashes, petechiae or ecchymosis. No icterus no ulcerations. No evidence of local recurrence  Neurologic:  Alert and oriented times 3.    SIGNIFICANT LABORATORY AND RADIOLOGY DATA:  Office Visit on 07/12/2017   Component Date Value   • VIA MED ONC PATHWAYS TAG 07/12/2017 [id number]    Lab Services on 07/12/2017   Component Date Value   • WBC 07/12/2017 6.2    • RBC 07/12/2017 4.45*   • HGB 07/12/2017 14.0    • HCT 07/12/2017 41.9    • MCV 07/12/2017 94.2    • MCH 07/12/2017 31.5    • MCHC 07/12/2017 33.4    • RDW-CV 07/12/2017 13.0    • PLT 07/12/2017 209    • DIFF TYPE 07/12/2017 AUTOMATED DIFFERENTIAL    • Neutrophil 07/12/2017 78    • LYMPH 07/12/2017 11    • MONO 07/12/2017 10    • EOSIN 07/12/2017 1    • BASO 07/12/2017 0    • Absolute Neutrophil 07/12/2017 4.8    • Absolute Lymph 07/12/2017 0.7*   • Absolute Mono 07/12/2017 0.6    • Absolute Eos 07/12/2017 0.1    • Absolute Baso 07/12/2017 0.0    • Fasting Status 07/12/2017 UNK    • Sodium 07/12/2017 142    • Potassium 07/12/2017 3.7    • Chloride 07/12/2017 106    • Carbon Dioxide 07/12/2017 29    • Anion Gap 07/12/2017 11    • Glucose 07/12/2017 114*   • BUN 07/12/2017 11    • Creatinine 07/12/2017 0.82    • GFR Estimate,  Am* 07/12/2017 >90    • GFR Estimate, Non Lorri* 07/12/2017 89    • BUN/Creatinine Ratio 07/12/2017 13    • CALCIUM 07/12/2017 8.5    • TOTAL BILIRUBIN 07/12/2017 0.7    • AST/SGOT 07/12/2017 12    • ALT/SGPT 07/12/2017 20    • ALK PHOSPHATASE 07/12/2017 45    • TOTAL PROTEIN 07/12/2017 6.0*   • Albumin 07/12/2017 3.2*   • GLOBULIN 07/12/2017 2.8    • A/G Ratio, Serum 07/12/2017 1.1    • LDH 07/12/2017 184        CLINICAL IMPRESSION AND PLAN:  Malignant melanoma. Patient will  continue treatment with pembrolizumab. The relationship of his  symptoms/problems and immunotherapy is uncertain. I discussed the possibility of holding his pembrolizumab therapy for a few months to see how he progresses but patient is not willing to do so. He will therefore continue treatment with pembrolizumab and continue follow-up with urology. He has a toenail fungal infection and will be started on therapy with terbinaftine and therefore his CMP will be checked on a weekly basis. I plan see him back and 6 weeks. He is asked to call us with any problems. Imaging studies will be repeated as clinically indicated.    This chart is completed using Dragon medical voice recognition. Because of limitations of this technology there may be some error in transcription that are unintended despite editing on my part. Please contact me if you need clarification.         Patrice Buchanan

## 2020-07-28 NOTE — PROGRESS NOTE ADULT - ASSESSMENT
Pt is a 73 yo M presenting from home after a fall resulting in L Femoral neck fracture. PMH HTN, HLD, "fast" HR.     L Femoral Neck Fracture:   -patient planned for orthopedic surgery today.   -RCRI 0 points.   -patient is medically optimized for orthopedic procedure.  -mgmt per surgical team  -pain control  -avoid NSAID's due to DONA    DONA:   -will hold enalapril  -will give gentle IVF  -monitor BP closely as he is hypertensive, likely in part due to pain.  -monitor BMP    HLD: continue statin    HTN: Continue Cardizem with hold parameters.     DVT ppx: SCD's, can add pharm DVT ppx if cleared by Ortho after surgery

## 2020-07-28 NOTE — PROGRESS NOTE ADULT - SUBJECTIVE AND OBJECTIVE BOX
Patient is a 74y old  Male who presents with a chief complaint of L Femoral neck fracture (2020 06:49)      INTERVAL HPI/OVERNIGHT EVENTS:  Patient seen examined. No overnight events. Pain controlled at present. Complains of sore throat that he woke up with. Denies chest pain, shortness of breath, palpitations. No N/V/D.     MEDICATIONS  (STANDING):  aspirin  chewable 81 milliGRAM(s) Oral daily  atorvastatin 20 milliGRAM(s) Oral at bedtime  benzocaine 15 mG/menthol 3.6 mG (Sugar-Free) Lozenge 1 Lozenge Oral once  diltiazem    milliGRAM(s) Oral daily  lactated ringers. 1000 milliLiter(s) (75 mL/Hr) IV Continuous <Continuous>    MEDICATIONS  (PRN):  acetaminophen   Tablet .. 650 milliGRAM(s) Oral every 6 hours PRN Mild Pain (1 - 3)  hydrALAZINE Injectable 5 milliGRAM(s) IV Push every 8 hours PRN SBP above 170  oxyCODONE    IR 10 milliGRAM(s) Oral every 6 hours PRN Severe Pain (7 - 10)  traMADol 50 milliGRAM(s) Oral every 6 hours PRN Moderate Pain (4 - 6)      Allergies    No Known Allergies    REVIEW OF SYSTEMS:  CONSTITUTIONAL: No fever or chills  HEENT:  No headache, +sore throat  RESPIRATORY: No cough, wheezing, or shortness of breath  CARDIOVASCULAR: No chest pain, palpitations  GASTROINTESTINAL: No abd pain, nausea, vomiting, or diarrhea  GENITOURINARY: No dysuria, frequency, or hematuria  NEUROLOGICAL: no focal weakness or dizziness  MUSCULOSKELETAL: no myalgias, +LLE pain    Vital Signs Last 24 Hrs  T(C): 36.3 (2020 05:18), Max: 37.7 (2020 19:41)  T(F): 97.3 (2020 05:18), Max: 99.8 (2020 19:41)  HR: 89 (2020 05:18) (77 - 89)  BP: 155/77 (2020 05:18) (147/87 - 164/91)  BP(mean): --  RR: 18 (2020 05:18) (16 - 18)  SpO2: 95% (2020 05:18) (94% - 96%)    PHYSICAL EXAM:  GENERAL: NAD  HEENT:  anicteric, moist mucous membranes  CHEST/LUNG:  CTA b/l, no rales, wheezes, or rhonchi  HEART:  RRR, S1, S2  ABDOMEN:  BS+, soft, nontender, nondistended  EXTREMITIES: no edema, cyanosis, or calf tenderness, +LLE decreased ROM due to pain  NERVOUS SYSTEM: answers questions and follows commands appropriately    LABS:                        13.0   10.56 )-----------( 204      ( 2020 09:12 )             38.4     CBC Full  -  ( 2020 09:12 )  WBC Count : 10.56 K/uL  Hemoglobin : 13.0 g/dL  Hematocrit : 38.4 %  Platelet Count - Automated : 204 K/uL  Mean Cell Volume : 91.6 fl  Mean Cell Hemoglobin : 31.0 pg  Mean Cell Hemoglobin Concentration : 33.9 gm/dL  Auto Neutrophil # : 8.63 K/uL  Auto Lymphocyte # : 1.03 K/uL  Auto Monocyte # : 0.78 K/uL  Auto Eosinophil # : 0.01 K/uL  Auto Basophil # : 0.06 K/uL  Auto Neutrophil % : 81.6 %  Auto Lymphocyte % : 9.8 %  Auto Monocyte % : 7.4 %  Auto Eosinophil % : 0.1 %  Auto Basophil % : 0.6 %    2020 09:12    141    |  110    |  25     ----------------------------<  106    4.3     |  26     |  1.30     Ca    8.6        2020 09:12    TPro  7.9    /  Alb  3.9    /  TBili  1.2    /  DBili  x      /  AST  22     /  ALT  20     /  AlkPhos  101    2020 14:18    PT/INR - ( 2020 04:32 )   PT: 12.4 sec;   INR: 1.06 ratio         PTT - ( 2020 04:32 )  PTT:27.8 sec  Urinalysis Basic - ( 2020 08:24 )    Color: Yellow / Appearance: Slightly Turbid / S.025 / pH: x  Gluc: x / Ketone: Small  / Bili: Negative / Urobili: 1   Blood: x / Protein: 30 mg/dL / Nitrite: Negative   Leuk Esterase: Negative / RBC: 6-10 /HPF / WBC Negative   Sq Epi: x / Non Sq Epi: Few / Bacteria: Few      CAPILLARY BLOOD GLUCOSE              RADIOLOGY & ADDITIONAL TESTS:    Personally reviewed.     Consultant(s) Notes Reviewed:  [x] YES  [ ] NO

## 2020-07-28 NOTE — DISCHARGE NOTE PROVIDER - NSDCCPCAREPLAN_GEN_ALL_CORE_FT
PRINCIPAL DISCHARGE DIAGNOSIS  Diagnosis: Closed fracture of left hip, initial encounter  Assessment and Plan of Treatment: Discharge Instructions Hemiarthroplasty  1. Diet: Resume previous diet  2. Activity: WBAT. Rolling walker. Posterior Hip Dislocation Precautions. Abduction Pillow while in bed and Chair. Daily Physical Therapy.  3. Call with: fever over 101, wound redness, drainage or open area, calf pain/calf swelling.  4. Wound Care: Remove old and Place new Aquacel bandage on POD #7, then daily dressing changes with dry sterile dressing.   5. RN to Remove Staples Post Op Day #14 (8/12/20) so long as wound is healed, no drainage or open area.   6. OK to Shower with Aquacel. Must be an Aquacel. Avoid direct water beating on bandage.   7. DVT PE Prophylaxis: see Anticoagulation Instructions per medical team. See Med Rec.  8.  Follow Up: Dr. Cespedes in 2 weeks. Call to schedule. PRINCIPAL DISCHARGE DIAGNOSIS  Diagnosis: Closed fracture of left hip, initial encounter  Assessment and Plan of Treatment: Discharge Instructions Hemiarthroplasty  1. Diet: Resume previous diet  2. Activity: WBAT. Rolling walker. Posterior Hip Dislocation Precautions. Abduction Pillow while in bed and Chair. Daily Physical Therapy.  3. Call with: fever over 101, wound redness, drainage or open area, calf pain/calf swelling.  4. Wound Care: Remove old and Place new Aquacel bandage on POD #7, then daily dressing changes with dry sterile dressing.   5. RN to Remove Staples Post Op Day #14 (8/12/20) so long as wound is healed, no drainage or open area.   6. OK to Shower with Aquacel. Must be an Aquacel. Avoid direct water beating on bandage.   7. DVT PE Prophylaxis: see Anticoagulation Instructions per medical team. See Med Rec.  8.  Follow Up: Dr. Cespedes in 2 weeks. Call to schedule.      SECONDARY DISCHARGE DIAGNOSES  Diagnosis: Anemia due to blood loss  Assessment and Plan of Treatment: your blood counts stabilized, please have your provider at rehab check a CBC within 2-3 days  of discharge to assure your hemoglobin conitnues to be stable, follow up with your primary medical provider within 1 week of discharge    Diagnosis: DONA (acute kidney injury)  Assessment and Plan of Treatment: you were seen by neprhology who optimized your care, please have BMP within 2-3 days of discharge to assure your renal function continues to be normal

## 2020-07-28 NOTE — DISCHARGE NOTE PROVIDER - NSDCMRMEDTOKEN_GEN_ALL_CORE_FT
aspirin 81 mg oral tablet, chewable: 1 tab(s) orally once a day  atorvastatin 20 mg oral tablet: 1 tab(s) orally once a day  dilTIAZem 120 mg/24 hours oral capsule, extended release: 1 cap(s) orally once a day  enalapril 20 mg oral tablet: 1 tab(s) orally once a day aspirin 81 mg oral tablet, chewable: 1 tab(s) orally once a day  atorvastatin 20 mg oral tablet: 1 tab(s) orally once a day  dilTIAZem 120 mg/24 hours oral capsule, extended release: 1 cap(s) orally once a day  enalapril 20 mg oral tablet: 1 tab(s) orally once a day  enoxaparin: 40 unit(s) subcutaneous once a day  Multiple Vitamins oral tablet: 1 tab(s) orally once a day  pantoprazole 40 mg oral delayed release tablet: 1 tab(s) orally once a day (before a meal)  polyethylene glycol 3350 oral powder for reconstitution: 17 gram(s) orally once a day  senna oral tablet: 2 tab(s) orally once a day (at bedtime), As needed, Constipation  traMADol 50 mg oral tablet: 1 tab(s) orally every 6 hours, As needed, Severe Pain (7 - 10)

## 2020-07-28 NOTE — PROGRESS NOTE ADULT - SUBJECTIVE AND OBJECTIVE BOX
[facility] -- Billy Tejdaa, Federico, Angel, Luisito Culp Savella  Office # [phone]      Follow Up:    htn  hld pre op evaluation     Subjective/Observations:   No events overnight resting comfortably in bed.  No complaints of chest pain, dyspnea, or palpitations reported. No signs of orthopnea or PND.      REVIEW OF SYSTEMS: All other review of systems is negative unless indicated above    PAST MEDICAL & SURGICAL HISTORY:  Hyperlipidemia  Hypertension  Hyperlipidemia  Hypertension  H/O hernia repair      MEDICATIONS  (STANDING):  aspirin  chewable 81 milliGRAM(s) Oral daily  atorvastatin 20 milliGRAM(s) Oral at bedtime  benzocaine 15 mG/menthol 3.6 mG (Sugar-Free) Lozenge 1 Lozenge Oral once  diltiazem    milliGRAM(s) Oral daily  lactated ringers. 1000 milliLiter(s) (75 mL/Hr) IV Continuous <Continuous>    MEDICATIONS  (PRN):  acetaminophen   Tablet .. 650 milliGRAM(s) Oral every 6 hours PRN Mild Pain (1 - 3)  hydrALAZINE Injectable 5 milliGRAM(s) IV Push every 8 hours PRN SBP above 170  oxyCODONE    IR 10 milliGRAM(s) Oral every 6 hours PRN Severe Pain (7 - 10)  traMADol 50 milliGRAM(s) Oral every 6 hours PRN Moderate Pain (4 - 6)      Allergies    No Known Allergies    Intolerances        Vital Signs Last 24 Hrs  T(C): 36.3 (28 Jul 2020 05:18), Max: 37.7 (27 Jul 2020 19:41)  T(F): 97.3 (28 Jul 2020 05:18), Max: 99.8 (27 Jul 2020 19:41)  HR: 89 (28 Jul 2020 05:18) (77 - 89)  BP: 155/77 (28 Jul 2020 05:18) (147/87 - 164/91)  BP(mean): --  RR: 18 (28 Jul 2020 05:18) (16 - 18)  SpO2: 95% (28 Jul 2020 05:18) (94% - 96%)    I&O's Summary    Weight (kg): 81.6 (07-27 @ 12:25), 81.6 (07-27 @ 11:55)    PHYSICAL EXAM:  TELE: not on tele   Constitutional: NAD, awake and alert, well-developed  HEENT: Moist Mucous Membranes, Anicteric  Pulmonary: Non-labored, breath sounds are clear bilaterally, No wheezing, crackles or rhonchi  Cardiovascular: Regular, S1 and S2 nl, No murmurs, rubs, gallops or clicks  Gastrointestinal: Bowel Sounds present, soft, nontender.   Lymph: No lymphadenopathy. No peripheral edema.  Skin: No visible rashes or ulcers.  Psych:  Mood & affect appropriate    LABS: All Labs Reviewed:                        13.0   10.56 )-----------( 204      ( 28 Jul 2020 09:12 )             38.4                         13.5   10.06 )-----------( 213      ( 28 Jul 2020 04:32 )             40.1                         15.1   12.28 )-----------( 236      ( 27 Jul 2020 14:18 )             43.8     28 Jul 2020 09:12    141    |  110    |  25     ----------------------------<  106    4.3     |  26     |  1.30   28 Jul 2020 04:32    143    |  110    |  25     ----------------------------<  113    4.0     |  27     |  1.50   27 Jul 2020 14:18    139    |  108    |  25     ----------------------------<  116    4.2     |  27     |  1.40     Ca    8.6        28 Jul 2020 09:12  Ca    8.7        28 Jul 2020 04:32  Ca    9.5        27 Jul 2020 14:18    TPro  7.9    /  Alb  3.9    /  TBili  1.2    /  DBili  x      /  AST  22     /  ALT  20     /  AlkPhos  101    27 Jul 2020 14:18    PT/INR - ( 28 Jul 2020 04:32 )   PT: 12.4 sec;   INR: 1.06 ratio         PTT - ( 28 Jul 2020 04:32 )  PTT:27.8 sec  CARDIAC MARKERS ( 27 Jul 2020 14:18 )  x     / x     / 217 U/L / x     / x             ECG:  < from: 12 Lead ECG (01.27.14 @ 13:03) >    Ventricular Rate 116 BPM    Atrial Rate 116 BPM    P-R Interval 196 ms    QRS Duration 74 ms     ms    QTc 425 ms    P Axis 28 degrees    R Axis -19 degrees    T Axis 34 degrees        Radiology:

## 2020-07-28 NOTE — PROGRESS NOTE ADULT - SUBJECTIVE AND OBJECTIVE BOX
74M w L FN fx for OR today with Dr Cespedes. Doing well pain well controlled, no cp ,sob, calf ttp. No complaints this AM, has been npo after midnight for surgery today.    Vital Signs Last 24 Hrs  T(C): 36.3 (28 Jul 2020 05:18), Max: 37.7 (27 Jul 2020 19:41)  T(F): 97.3 (28 Jul 2020 05:18), Max: 99.8 (27 Jul 2020 19:41)  HR: 89 (28 Jul 2020 05:18) (77 - 89)  BP: 155/77 (28 Jul 2020 05:18) (147/87 - 164/91)  BP(mean): --  RR: 18 (28 Jul 2020 05:18) (16 - 18)  SpO2: 95% (28 Jul 2020 05:18) (94% - 96%)      LABS:  07-27 @ 14:18 Creatine 217 U/L [26 - 308]  cret                        13.5   10.06 )-----------( 213      ( 28 Jul 2020 04:32 )             40.1     07-28    143  |  110<H>  |  25<H>  ----------------------------<  113<H>  4.0   |  27  |  1.50<H>    Ca    8.7      28 Jul 2020 04:32    TPro  7.9  /  Alb  3.9  /  TBili  1.2  /  DBili  x   /  AST  22  /  ALT  20  /  AlkPhos  101  07-27    PT/INR - ( 28 Jul 2020 04:32 )   PT: 12.4 sec;   INR: 1.06 ratio         PTT - ( 28 Jul 2020 04:32 )  PTT:27.8 sec      LLE:  Skin intact; No ecchymosis/soft tissue swelling  Compartments soft; + TTP about hip. No TTP to knee/leg/ankle/foot   ROM limited 2/2 pain   Unable to SLR; + Log Roll/Heel Strike  Motor intact GS/TA/FHL/EHL  SILT L2-S1  DP/PT pulses weak but present, foot warm and well perfused, cap refill < 2s      Imaging:  XR demonstrating a displaced left femoral neck fracture.

## 2020-07-28 NOTE — PROGRESS NOTE ADULT - ASSESSMENT
74M with degenerative spondylolithesis and a displaced left femoral neck fracture    - Patient with above diagnosis, will require operative fixation today for optimal outcome of left hip with hemiarthroplasty.   - Pain control  - NPO/IVF  - CBC/BMP/Coags/UA/T+S x2  - EKG/CXR  - COVID test  - Medical and cardiac clearance documented for OR   - Discussed with Dr. Cespedes who agrees with plan. will advise if plan changes

## 2020-07-28 NOTE — DISCHARGE NOTE PROVIDER - CARE PROVIDER_API CALL
Mildred Cespedes S  ORTHOPAEDIC SURGERY  [address] [facility] [address]  [place] [address]  Phone: [phone]  Fax: [phone]  Follow Up Time:

## 2020-07-28 NOTE — PROGRESS NOTE ADULT - ASSESSMENT
DONA  HTN  L Femoral Neck Fx    -Unknown baseline creatinine  -DONA likely 2/2 NSAID usage combined with decreased PO intake  -urine studies reviewed showing a FENA < 1%; prerenal disease  -Renal function is improving well, cr down to 1.3  -IVF while NPO; oral intake encouraged when diet is restarted  -Hold lisinopril  -AVOID NSAIDS FOR PAIN  -Can give PRN hydralazine for BP    Thank you

## 2020-07-29 DIAGNOSIS — N17.9 ACUTE KIDNEY FAILURE, UNSPECIFIED: ICD-10-CM

## 2020-07-29 DIAGNOSIS — E78.5 HYPERLIPIDEMIA, UNSPECIFIED: ICD-10-CM

## 2020-07-29 DIAGNOSIS — I10 ESSENTIAL (PRIMARY) HYPERTENSION: ICD-10-CM

## 2020-07-29 DIAGNOSIS — S72.002A FRACTURE OF UNSPECIFIED PART OF NECK OF LEFT FEMUR, INITIAL ENCOUNTER FOR CLOSED FRACTURE: ICD-10-CM

## 2020-07-29 DIAGNOSIS — D50.0 IRON DEFICIENCY ANEMIA SECONDARY TO BLOOD LOSS (CHRONIC): ICD-10-CM

## 2020-07-29 DIAGNOSIS — Z29.9 ENCOUNTER FOR PROPHYLACTIC MEASURES, UNSPECIFIED: ICD-10-CM

## 2020-07-29 LAB
ANION GAP SERPL CALC-SCNC: 5 MMOL/L — SIGNIFICANT CHANGE UP (ref 5–17)
BUN SERPL-MCNC: 31 MG/DL — HIGH (ref 7–23)
CALCIUM SERPL-MCNC: 8.3 MG/DL — LOW (ref 8.5–10.1)
CHLORIDE SERPL-SCNC: 108 MMOL/L — SIGNIFICANT CHANGE UP (ref 96–108)
CO2 SERPL-SCNC: 26 MMOL/L — SIGNIFICANT CHANGE UP (ref 22–31)
CREAT SERPL-MCNC: 1.4 MG/DL — HIGH (ref 0.5–1.3)
GLUCOSE SERPL-MCNC: 144 MG/DL — HIGH (ref 70–99)
HCT VFR BLD CALC: 31.1 % — LOW (ref 39–50)
HGB BLD-MCNC: 10.7 G/DL — LOW (ref 13–17)
MCHC RBC-ENTMCNC: 31.6 PG — SIGNIFICANT CHANGE UP (ref 27–34)
MCHC RBC-ENTMCNC: 34.4 GM/DL — SIGNIFICANT CHANGE UP (ref 32–36)
MCV RBC AUTO: 91.7 FL — SIGNIFICANT CHANGE UP (ref 80–100)
NRBC # BLD: 0 /100 WBCS — SIGNIFICANT CHANGE UP (ref 0–0)
PLATELET # BLD AUTO: 161 K/UL — SIGNIFICANT CHANGE UP (ref 150–400)
POTASSIUM SERPL-MCNC: 4.6 MMOL/L — SIGNIFICANT CHANGE UP (ref 3.5–5.3)
POTASSIUM SERPL-SCNC: 4.6 MMOL/L — SIGNIFICANT CHANGE UP (ref 3.5–5.3)
RBC # BLD: 3.39 M/UL — LOW (ref 4.2–5.8)
RBC # FLD: 12.8 % — SIGNIFICANT CHANGE UP (ref 10.3–14.5)
SODIUM SERPL-SCNC: 139 MMOL/L — SIGNIFICANT CHANGE UP (ref 135–145)
WBC # BLD: 10.28 K/UL — SIGNIFICANT CHANGE UP (ref 3.8–10.5)
WBC # FLD AUTO: 10.28 K/UL — SIGNIFICANT CHANGE UP (ref 3.8–10.5)

## 2020-07-29 PROCEDURE — 99232 SBSQ HOSP IP/OBS MODERATE 35: CPT

## 2020-07-29 PROCEDURE — 99233 SBSQ HOSP IP/OBS HIGH 50: CPT | Mod: GC

## 2020-07-29 RX ORDER — SODIUM CHLORIDE 9 MG/ML
1000 INJECTION INTRAMUSCULAR; INTRAVENOUS; SUBCUTANEOUS
Refills: 0 | Status: DISCONTINUED | OUTPATIENT
Start: 2020-07-29 | End: 2020-07-31

## 2020-07-29 RX ORDER — BENZOCAINE AND MENTHOL 5; 1 G/100ML; G/100ML
1 LIQUID ORAL THREE TIMES A DAY
Refills: 0 | Status: DISCONTINUED | OUTPATIENT
Start: 2020-07-29 | End: 2020-07-31

## 2020-07-29 RX ORDER — TRAMADOL HYDROCHLORIDE 50 MG/1
50 TABLET ORAL EVERY 6 HOURS
Refills: 0 | Status: DISCONTINUED | OUTPATIENT
Start: 2020-07-29 | End: 2020-07-31

## 2020-07-29 RX ORDER — HYDRALAZINE HCL 50 MG
25 TABLET ORAL EVERY 6 HOURS
Refills: 0 | Status: DISCONTINUED | OUTPATIENT
Start: 2020-07-29 | End: 2020-07-31

## 2020-07-29 RX ADMIN — POLYETHYLENE GLYCOL 3350 17 GRAM(S): 17 POWDER, FOR SOLUTION ORAL at 11:18

## 2020-07-29 RX ADMIN — SODIUM CHLORIDE 100 MILLILITER(S): 9 INJECTION INTRAMUSCULAR; INTRAVENOUS; SUBCUTANEOUS at 18:14

## 2020-07-29 RX ADMIN — Medication 1 TABLET(S): at 11:19

## 2020-07-29 RX ADMIN — ATORVASTATIN CALCIUM 20 MILLIGRAM(S): 80 TABLET, FILM COATED ORAL at 21:08

## 2020-07-29 RX ADMIN — Medication 650 MILLIGRAM(S): at 17:41

## 2020-07-29 RX ADMIN — Medication 650 MILLIGRAM(S): at 11:19

## 2020-07-29 RX ADMIN — Medication 100 MILLIGRAM(S): at 08:35

## 2020-07-29 RX ADMIN — Medication 81 MILLIGRAM(S): at 11:19

## 2020-07-29 RX ADMIN — Medication 20 MILLIGRAM(S): at 10:02

## 2020-07-29 RX ADMIN — ENOXAPARIN SODIUM 40 MILLIGRAM(S): 100 INJECTION SUBCUTANEOUS at 00:33

## 2020-07-29 RX ADMIN — BENZOCAINE AND MENTHOL 1 LOZENGE: 5; 1 LIQUID ORAL at 21:08

## 2020-07-29 RX ADMIN — BENZOCAINE AND MENTHOL 1 LOZENGE: 5; 1 LIQUID ORAL at 17:40

## 2020-07-29 RX ADMIN — Medication 650 MILLIGRAM(S): at 12:19

## 2020-07-29 RX ADMIN — Medication 100 MILLIGRAM(S): at 00:34

## 2020-07-29 RX ADMIN — Medication 650 MILLIGRAM(S): at 00:33

## 2020-07-29 RX ADMIN — PANTOPRAZOLE SODIUM 40 MILLIGRAM(S): 20 TABLET, DELAYED RELEASE ORAL at 07:55

## 2020-07-29 RX ADMIN — Medication 120 MILLIGRAM(S): at 05:51

## 2020-07-29 NOTE — OCCUPATIONAL THERAPY INITIAL EVALUATION ADULT - TRANSFER TRAINING, PT EVAL
pt will improve sit to/from stands to CGx1 in prep for safe commode transfers with RW within 3-5 sessions

## 2020-07-29 NOTE — PROGRESS NOTE ADULT - SUBJECTIVE AND OBJECTIVE BOX
Patient is a 74y old  Male who presents with a chief complaint of L Femoral neck fracture (2020 09:16)      INTERVAL HPI/OVERNIGHT EVENTS: Patient seen and examined at bedside. No overnight events occurred. Patient has no complaints at this time. Denies fevers, chills, headache, lightheadedness, chest pain, SOB, palpitations, dyspnea, abdominal pain, calf pain, n/v/d/c. Patient co pain on inside of left knee, states current pain medication is sufficient. Patient is tolerating diet, states he passed gas, is urinating.     MEDICATIONS  (STANDING):  acetaminophen   Tablet .. 650 milliGRAM(s) Oral every 6 hours  aspirin  chewable 81 milliGRAM(s) Oral daily  atorvastatin 20 milliGRAM(s) Oral at bedtime  BUpivacaine liposome 1.3% Injectable 20 milliLiter(s) Local Injection once  diltiazem    milliGRAM(s) Oral daily  enalapril 20 milliGRAM(s) Oral daily  enoxaparin Injectable 40 milliGRAM(s) SubCutaneous every 24 hours  multivitamin 1 Tablet(s) Oral daily  pantoprazole    Tablet 40 milliGRAM(s) Oral before breakfast  polyethylene glycol 3350 17 Gram(s) Oral daily    MEDICATIONS  (PRN):  aluminum hydroxide/magnesium hydroxide/simethicone Suspension 30 milliLiter(s) Oral four times a day PRN Indigestion  hydrALAZINE 25 milliGRAM(s) Oral every 6 hours PRN Systolic blood pressure > 160  morphine  - Injectable 2 milliGRAM(s) IV Push every 3 hours PRN Severe Pain (7 - 10)  ondansetron Injectable 4 milliGRAM(s) IV Push every 6 hours PRN Nausea and/or Vomiting  oxyCODONE    IR 5 milliGRAM(s) Oral every 4 hours PRN Mild Pain (1 - 3)  oxyCODONE    IR 10 milliGRAM(s) Oral every 4 hours PRN Moderate Pain (4 - 6)  senna 2 Tablet(s) Oral at bedtime PRN Constipation      Allergies    No Known Allergies    Intolerances        REVIEW OF SYSTEMS:  CONSTITUTIONAL: No fever or chills  HEENT:  No headache, no sore throat  RESPIRATORY: No cough, wheezing, or shortness of breath  CARDIOVASCULAR: No chest pain, palpitations  GASTROINTESTINAL: No abd pain, nausea, vomiting, or diarrhea  GENITOURINARY: No dysuria, frequency, or hematuria  NEUROLOGICAL: no focal weakness or dizziness  MUSCULOSKELETAL: no myalgias, pain in inside of left knee    Vital Signs Last 24 Hrs  T(C): 36.5 (2020 10:10), Max: 38.2 (2020 15:29)  T(F): 97.7 (2020 10:10), Max: 100.7 (2020 15:29)  HR: 59 (2020 10:10) (53 - 79)  BP: 166/66 (2020 10:10) (165/67 - 202/77)  BP(mean): --  RR: 18 (2020 10:10) (14 - 20)  SpO2: 96% (2020 10:10) (94% - 100%)    PHYSICAL EXAM:  GENERAL: NAD  HEENT:  anicteric, moist mucous membranes  CHEST/LUNG:  CTA b/l, no rales, wheezes, or rhonchi  HEART:  RRR, S1, S2, systolic murmur present  ABDOMEN:  BS+, soft, nontender, nondistended  EXTREMITIES: cyanosis, or calf tenderness, +1 mild edema in left foot and ankle  NERVOUS SYSTEM: answers questions and follows commands appropriately    LABS:                        10.7   10.28 )-----------( 161      ( 2020 07:18 )             31.1     CBC Full  -  ( 2020 07:18 )  WBC Count : 10.28 K/uL  Hemoglobin : 10.7 g/dL  Hematocrit : 31.1 %  Platelet Count - Automated : 161 K/uL  Mean Cell Volume : 91.7 fl  Mean Cell Hemoglobin : 31.6 pg  Mean Cell Hemoglobin Concentration : 34.4 gm/dL  Auto Neutrophil # : x  Auto Lymphocyte # : x  Auto Monocyte # : x  Auto Eosinophil # : x  Auto Basophil # : x  Auto Neutrophil % : x  Auto Lymphocyte % : x  Auto Monocyte % : x  Auto Eosinophil % : x  Auto Basophil % : x    2020 07:18    139    |  108    |  31     ----------------------------<  144    4.6     |  26     |  1.40     Ca    8.3        2020 07:18      PT/INR - ( 2020 04:32 )   PT: 12.4 sec;   INR: 1.06 ratio         PTT - ( 2020 04:32 )  PTT:27.8 sec  Urinalysis Basic - ( 2020 08:24 )    Color: Yellow / Appearance: Slightly Turbid / S.025 / pH: x  Gluc: x / Ketone: Small  / Bili: Negative / Urobili: 1   Blood: x / Protein: 30 mg/dL / Nitrite: Negative   Leuk Esterase: Negative / RBC: 6-10 /HPF / WBC Negative   Sq Epi: x / Non Sq Epi: Few / Bacteria: Few      CAPILLARY BLOOD GLUCOSE              RADIOLOGY & ADDITIONAL TESTS:    Personally reviewed.     Consultant(s) Notes Reviewed:  [x] YES  [ ] NO

## 2020-07-29 NOTE — PROGRESS NOTE ADULT - ASSESSMENT
Pt is a 73 yo M presenting from home after a fall resulting in L Femoral neck fracture. PMH HTN, HLD, "fast" HR.     L Femoral Neck Fracture:   -patient is post-op day 1  -mgmt per ortho team  -pain control  -avoid NSAID's due to DONA    DONA:   - restarted enalapril  - Cr stable, nephrology note appreciated  - changed hydralazine po [id number] prn  -monitor BP closely as he is hypertensive  -monitor BMP    anemia  - pt hbg 10.7, likely 2/2 surgical loss  - continue to follow, transfuse if Hgb <7  - repeat hgb AM    HLD: continue statin    HTN: Continue Cardizem, enalapril and hydralazine prn with hold parameters.     DVT ppx: lovenox 40mg QD    PT eval: plan rehab once medically optimized 75 yo M with  PMH HTN, HLD, "fast" HR presenting from home after a fall a/w L Femoral neck fracture  s/p L hip NAZ, POD # 1    L Femoral Neck Fracture:   -patient is post-op day 1  -mgmt per ortho team  -pain control  -avoid NSAID's due to DONA    DONA:   - restarted enalapril  - Cr stable, nephrology note appreciated  - changed hydralazine po [id number] prn  -monitor BP closely as he is hypertensive  -monitor BMP    anemia  - pt hbg 10.7, likely 2/2 surgical loss  - continue to follow, transfuse if Hgb <7  - repeat hgb AM    HLD: continue statin    HTN: Continue Cardizem, enalapril and hydralazine prn with hold parameters.     DVT ppx: lovenox 40mg QD    PT eval: plan rehab once medically optimized 75 yo M with  PMH HTN, HLD, "fast" HR presenting from home after a fall a/w L Femoral neck fracture  s/p L hip NAZ, POD # 1

## 2020-07-29 NOTE — OCCUPATIONAL THERAPY INITIAL EVALUATION ADULT - IADL RETRAINING, OT EVAL
pt will improve standing renan to 2-3 minutes with RW in prep for safe toileting at [facility] within 3-5 sessions

## 2020-07-29 NOTE — PROGRESS NOTE ADULT - PROBLEM SELECTOR PLAN 2
-acute, improving  - restarted enalapril  - Cr stable, nephrology note appreciated  - changed hydralazine po [id number] prn  -monitor BP closely as he is hypertensive  -monitor BMP -acute,   -cr 1.4  - restarted enalapril  - ivf for 1.4 cr, reassess tomorrow  - changed hydralazine po [id number] prn  -monitor BP closely as he is hypertensive  -monitor BMP  -apprec renal recs

## 2020-07-29 NOTE — OCCUPATIONAL THERAPY INITIAL EVALUATION ADULT - ADDITIONAL COMMENTS
Pt reported he lives with his son in a garden apartment, no steps. Bathroom has a tub. PTA pt was independent with ADLs and used SAC for functional mobility.

## 2020-07-29 NOTE — PROGRESS NOTE ADULT - SUBJECTIVE AND OBJECTIVE BOX
[facility] -- Billy Tejada, Angel Caballero, Luisito Kimball Savella  Office # [phone]      Follow Up:    HTn Preop eval/Post follow up   Subjective/Observations:   No events overnight resting comfortably in bed.  No complaints of chest pain, dyspnea, or palpitations reported. No signs of orthopnea or PND.     REVIEW OF SYSTEMS: All other review of systems is negative unless indicated above    PAST MEDICAL & SURGICAL HISTORY:  Hyperlipidemia  Hypertension  Hyperlipidemia  Hypertension  H/O hernia repair      MEDICATIONS  (STANDING):  acetaminophen   Tablet .. 650 milliGRAM(s) Oral every 6 hours  aspirin  chewable 81 milliGRAM(s) Oral daily  atorvastatin 20 milliGRAM(s) Oral at bedtime  BUpivacaine liposome 1.3% Injectable 20 milliLiter(s) Local Injection once  diltiazem    milliGRAM(s) Oral daily  enalapril 20 milliGRAM(s) Oral daily  enoxaparin Injectable 40 milliGRAM(s) SubCutaneous every 24 hours  multivitamin 1 Tablet(s) Oral daily  pantoprazole    Tablet 40 milliGRAM(s) Oral before breakfast  polyethylene glycol 3350 17 Gram(s) Oral daily    MEDICATIONS  (PRN):  aluminum hydroxide/magnesium hydroxide/simethicone Suspension 30 milliLiter(s) Oral four times a day PRN Indigestion  hydrALAZINE Injectable 5 milliGRAM(s) IV Push every 8 hours PRN SBP above 170  morphine  - Injectable 2 milliGRAM(s) IV Push every 3 hours PRN Severe Pain (7 - 10)  ondansetron Injectable 4 milliGRAM(s) IV Push every 6 hours PRN Nausea and/or Vomiting  oxyCODONE    IR 5 milliGRAM(s) Oral every 4 hours PRN Mild Pain (1 - 3)  oxyCODONE    IR 10 milliGRAM(s) Oral every 4 hours PRN Moderate Pain (4 - 6)  senna 2 Tablet(s) Oral at bedtime PRN Constipation      Allergies    No Known Allergies    Intolerances        Vital Signs Last 24 Hrs  T(C): 36.8 (29 Jul 2020 05:07), Max: 38.2 (28 Jul 2020 15:29)  T(F): 98.3 (29 Jul 2020 05:07), Max: 100.7 (28 Jul 2020 15:29)  HR: 53 (29 Jul 2020 05:07) (53 - 79)  BP: 181/71 (29 Jul 2020 05:07) (165/67 - 202/77)  BP(mean): --  RR: 18 (29 Jul 2020 05:07) (14 - 20)  SpO2: 94% (29 Jul 2020 05:07) (94% - 100%)    I&O's Summary    28 Jul 2020 07:01  -  29 Jul 2020 07:00  --------------------------------------------------------  IN: 140 mL / OUT: 0 mL / NET: 140 mL      Weight (kg): 81.6 (07-28 @ 16:43)    PHYSICAL EXAM:  TELE: Off tele   Constitutional: NAD, awake and alert, well-developed  HEENT: Moist Mucous Membranes, Anicteric  Pulmonary: Non-labored, breath sounds are clear bilaterally, No wheezing, crackles or rhonchi  Cardiovascular: Regular, S1 and S2 nl, No murmurs, rubs, gallops or clicks  Gastrointestinal: Bowel Sounds present, soft, nontender.   Lymph: No lymphadenopathy. No peripheral edema.  Skin: No visible rashes or ulcers.  Psych:  Mood & affect appropriate    LABS: All Labs Reviewed:                        10.7   10.28 )-----------( 161      ( 29 Jul 2020 07:18 )             31.1                         11.5   13.18 )-----------( 160      ( 28 Jul 2020 21:31 )             34.8                         13.0   10.56 )-----------( 204      ( 28 Jul 2020 09:12 )             38.4     29 Jul 2020 07:18    139    |  108    |  31     ----------------------------<  144    4.6     |  26     |  1.40   28 Jul 2020 21:31    140    |  109    |  26     ----------------------------<  130    4.7     |  24     |  1.40   28 Jul 2020 09:12    141    |  110    |  25     ----------------------------<  106    4.3     |  26     |  1.30     Ca    8.3        29 Jul 2020 07:18  Ca    8.2        28 Jul 2020 21:31  Ca    8.6        28 Jul 2020 09:12    TPro  7.9    /  Alb  3.9    /  TBili  1.2    /  DBili  x      /  AST  22     /  ALT  20     /  AlkPhos  101    27 Jul 2020 14:18    PT/INR - ( 28 Jul 2020 04:32 )   PT: 12.4 sec;   INR: 1.06 ratio         PTT - ( 28 Jul 2020 04:32 )  PTT:27.8 sec  CARDIAC MARKERS ( 27 Jul 2020 14:18 )  x     / x     / 217 U/L / x     / x             ECG:  < from: 12 Lead ECG (07.27.20 @ 17:48) >  Ventricular Rate 76 BPM    Atrial Rate 76 BPM    P-R Interval 372 ms    QRS Duration 70 ms    Q-T Interval 398 ms    QTC Calculation(Bezet) 447 ms    P Axis 71 degrees    R Axis -20 degrees    T Axis 14 degrees    Diagnosis Line Sinus rhythm with 1st degree AV block  Otherwise normal ECG  Confirmed by MACRINA KIMBALL ([id number]) on 7/28/2020 2:24:51 PM    < end of copied text >      Radiology:  < from: Xray Chest 1 View AP/PA (07.27.20 @ 15:11) >  EXAM:  XR CHEST AP OR PA 1V                            PROCEDURE DATE:  07/27/2020          INTERPRETATION:  CLINICAL INFORMATION:Status post fall with left hip and back pain    TECHNIQUE: AP chest film. No prior studies available for comparison.    FINDINGS:There is no focal consolidation or pleural effusion.  Heart size cannot be accurately evaluated in this projection. The osseous structures are intact.    IMPRESSION: No focal consolidation or pleural effusion.              SOFYA BROUSSARD M.D., ATTENDING RADIOLOGIST  This document has been electronically signed. Jul 27 2020  4:03PM                < end of copied text >

## 2020-07-29 NOTE — PROGRESS NOTE ADULT - SUBJECTIVE AND OBJECTIVE BOX
Patient is seen and examined at bedside. Denies CP/SOB/Dizziness/N/V/D/HA. Pain is controlled.     Vital Signs Last 24 Hrs  T(C): 36.8 (29 Jul 2020 05:07), Max: 38.2 (28 Jul 2020 15:29)  T(F): 98.3 (29 Jul 2020 05:07), Max: 100.7 (28 Jul 2020 15:29)  HR: 53 (29 Jul 2020 05:07) (53 - 79)  BP: 181/71 (29 Jul 2020 05:07) (165/67 - 202/77)  BP(mean): --  RR: 18 (29 Jul 2020 05:07) (14 - 20)  SpO2: 94% (29 Jul 2020 05:07) (94% - 100%)    GEN: NAD  Respiratory: CTA B/L; Cardiac: +S1/S2; RRR; no M/R/G; PMI non-displaced  ABD: soft, NT/ND; no rebound or guarding; +BS   CV:  S1, S2, no murmurs  Neurologic: AAOx3; CNS grossly intact; no focal deficits  LE: Dressing C/D/I. abduction pillow in place  Motor intact + EHL/FHL/TA/GS in the BL LE. Sensation is grossly intact distal . Extremity warm. Compartments are soft. DP 1+    Labs:                          11.5   13.18 )-----------( 160      ( 28 Jul 2020 21:31 )             34.8       07-28    140  |  109<H>  |  26<H>  ----------------------------<  130<H>  4.7   |  24  |  1.40<H>    Ca    8.2<L>      28 Jul 2020 21:31    TPro  7.9  /  Alb  3.9  /  TBili  1.2  /  DBili  x   /  AST  22  /  ALT  20  /  AlkPhos  101  07-27      A/P: Patient is a 74y y/o Male s/p L hip NAZ, POD # 1    -MRI this am for lumbar radiculopathy  -Pain control/analgesia  -Inc spirometry  -Venodynes/foot pumps  -F/U AM Labs  -PT/OT/WBAT with a walker x 6 weeks (due to poor bone quality intraoperatively)  -Antibiotic  -Anticoagulation  -Hip precautions  -Dispo Patient is seen and examined at bedside. Denies CP/SOB/Dizziness/N/V/D/HA. Pain is controlled.     Vital Signs Last 24 Hrs  T(C): 36.8 (29 Jul 2020 05:07), Max: 38.2 (28 Jul 2020 15:29)  T(F): 98.3 (29 Jul 2020 05:07), Max: 100.7 (28 Jul 2020 15:29)  HR: 53 (29 Jul 2020 05:07) (53 - 79)  BP: 181/71 (29 Jul 2020 05:07) (165/67 - 202/77)  BP(mean): --  RR: 18 (29 Jul 2020 05:07) (14 - 20)  SpO2: 94% (29 Jul 2020 05:07) (94% - 100%)    GEN: NAD  Respiratory: CTA B/L; Cardiac: +S1/S2; RRR; no M/R/G; PMI non-displaced  ABD: soft, NT/ND; no rebound or guarding; +BS   CV:  S1, S2, no murmurs  Neurologic: AAOx3; CNS grossly intact; no focal deficits  LE: Dressing C/D/I. abduction pillow in place  Motor intact + EHL/FHL/TA/GS in the BL LE. Sensation is grossly intact distal . Extremity warm. Compartments are soft. DP 1+    Labs:                          11.5   13.18 )-----------( 160      ( 28 Jul 2020 21:31 )             34.8       07-28    140  |  109<H>  |  26<H>  ----------------------------<  130<H>  4.7   |  24  |  1.40<H>    Ca    8.2<L>      28 Jul 2020 21:31    TPro  7.9  /  Alb  3.9  /  TBili  1.2  /  DBili  x   /  AST  22  /  ALT  20  /  AlkPhos  101  07-27      A/P: Patient is a 74y y/o Male s/p L hip NAZ, POD # 1    -MRI was recommended this am for lumbar radiculopathy and leg weakness, but states he doesnt want MRI. Previously he dropped his BP in MRI machine and therefore doesnt want to get on now.   -Pain control/analgesia  -Inc spirometry  -Venodynes/foot pumps  -F/U AM Labs  -PT/OT/WBAT with a walker x 6 weeks (due to poor bone quality intraoperatively)  -Antibiotic  -Anticoagulation  -Hip precautions  -Dispo

## 2020-07-29 NOTE — PROGRESS NOTE ADULT - ASSESSMENT
DONA on CKD Stage 3  HTN  L Femoral Neck Fx      -DONA likely 2/2 NSAID usage combined with decreased PO intake  -urine studies reviewed showing a FENA < 1%; prerenal disease  -Renal function is improved well; creatinine is stable 1.3-1.4; baseline likely  -DC IVF; oral intake encouraged  -AVOID NSAIDS FOR PAIN  -BP uncontrolled; will restart ACEi and monitor for renal tolerance    Thank you

## 2020-07-29 NOTE — OCCUPATIONAL THERAPY INITIAL EVALUATION ADULT - PERTINENT HX OF CURRENT PROBLEM, REHAB EVAL
Pt is a 75 y/o male presenting from home after a fall resulting in L Femoral neck fracture after left knee gave out. S/p left hip hemiarthroplasty 7/28/2020.

## 2020-07-29 NOTE — PROGRESS NOTE ADULT - SUBJECTIVE AND OBJECTIVE BOX
Patient is a 74y old  Male who presents with a chief complaint of L Femoral neck fracture (2020 18:23)       HPI:  Pt is a 75 yo M presenting from home after a fall resulting in L Femoral neck fracture. PMH HTN, HLD, "fast" HR.   Pt seen and examined at bedside.   He states that last night he was walking when all of a sudden his L leg buckled at the knee causing him to fall. He denies any head traume or LOC. He states that his leg simply gave out. He denies any assoc focal leg weakness, numbness or tingling. He has L hip pain, worse with movement, sharp, currently 4/10 in severity. No radiation elsewhere. He has no other complaints at this time.   Denies past hx of MI/TIA/CVA/CHF/DM2. He has never had any adverse reaction to anesthesia. no family hx of adverse reactions to anesthesia. He has no other complaints at this time. He can ambulate 2 blocks without associated chest pain or SOB.   Of note he was noted to have DONA and reports having taken Motrin for the last 2 days every 4 hours for pain.  Thinks he was taking 200mg tabs, but not sure.  He has never seen a nephrologist or been told of any issues with his kidneys.  He states he has had much PO intake today. Denies dysuria.  States he has some minor difficulty initiating his stream today which he has not had in the past, but felt he was able to void adequately.       Follow up DONA  No complaints this morning; urinating well  Tolerating PO    PAST MEDICAL & SURGICAL HISTORY:  Hyperlipidemia  Hypertension  Hyperlipidemia  Hypertension  H/O hernia repair       FAMILY HISTORY:  NC    Social History:Non smoker    MEDICATIONS  (STANDING):  aspirin  chewable 81 milliGRAM(s) Oral daily  atorvastatin 20 milliGRAM(s) Oral at bedtime  diltiazem    milliGRAM(s) Oral daily  sodium chloride 0.9%. 1000 milliLiter(s) (75 mL/Hr) IV Continuous <Continuous>    MEDICATIONS  (PRN):  acetaminophen   Tablet .. 650 milliGRAM(s) Oral every 6 hours PRN Mild Pain (1 - 3)  oxyCODONE    IR 10 milliGRAM(s) Oral every 6 hours PRN Severe Pain (7 - 10)  traMADol 50 milliGRAM(s) Oral every 6 hours PRN Moderate Pain (4 - 6)   Meds reviewed    Allergies    No Known Allergies    Intolerances         REVIEW OF SYSTEMS:    CONSTITUTIONAL:  No weight loss   EYES: No eye pain, visual disturbances, or discharge  ENMT:  No difficulty hearing, tinnitus, vertigo; No sinus or throat pain  NECK: No pain or stiffness  BREASTS: No pain, masses, or nipple discharge  RESPIRATORY: No SOB. No wheeze. No PONCE  CARDIOVASCULAR: No chest pain, palpitations, dizziness,   GASTROINTESTINAL: No abdominal or epigastric pain. No nausea, vomiting, or hematemesis; No diarrhea or constipation.   GENITOURINARY: No dysuria, frequency, hematuria, or incontinence  NEUROLOGICAL: No headaches, memory loss, loss of strength, numbness, or tremors  SKIN: No Diffuse erythema, no blisters  LYMPH NODES: No enlarged glands  ENDOCRINE: No heat or cold intolerance; No hair loss  MUSCULOSKELETAL: No joint pain or swelling   PSYCHIATRIC: No depression, anxiety, mood swings, or difficulty sleeping  HEME/LYMPH: No easy bruising, or bleeding gums  ALLERGY AND IMMUNOLOGIC: No hives or eczema      Vital Signs Last 24 Hrs  T(C): 37.7 (2020 19:41), Max: 37.7 (2020 19:41)  T(F): 99.8 (2020 19:41), Max: 99.8 (2020 19:41)  HR: 80 (2020 19:41) (80 - 86)  BP: 160/90 (2020 19:41) (160/90 - 164/91)  BP(mean): --  RR: 16 (2020 19:41) (16 - 18)  SpO2: 96% (2020 19:41) (96% - 96%)  Daily Height in cm: 172.72 (2020 12:25)    Daily     PHYSICAL EXAM:    GENERAL: No distress, sitting in chair  HEAD:  Atraumatic, Normocephalic  EYES: EOMI,  conjunctiva and sclera clear  ENMT: No drainage from nares, no drainage from pinna  NECK: Supple, neck veins full  NERVOUS SYSTEM:  Alert & Oriented X3, Good concentration; Motor Strength wnl upper and lower extremities  CHEST/LUNG: Clear to percussion bilaterally; No rales, rhonchi, wheezing, or rubs  HEART: Regular rate and rhythm; No murmurs, rubs, or gallops  ABDOMEN: Soft, Nontender, Nondistended; Bowel sounds present, obese  EXTREMITIES:  L ankle Edema  SKIN: No rashes or lesions      LABS:                        10.7   10.28 )-----------( 161      ( 2020 07:18 )             31.1     07-29    139  |  108  |  31<H>  ----------------------------<  144<H>  4.6   |  26  |  1.40<H>    Ca    8.3<L>      2020 07:18    TPro  7.9  /  Alb  3.9  /  TBili  1.2  /  DBili  x   /  AST  22  /  ALT  20  /  AlkPhos  101  07-27    PT/INR - ( 2020 04:32 )   PT: 12.4 sec;   INR: 1.06 ratio         PTT - ( 2020 04:32 )  PTT:27.8 sec  Urinalysis Basic - ( 2020 08:24 )    Color: Yellow / Appearance: Slightly Turbid / S.025 / pH: x  Gluc: x / Ketone: Small  / Bili: Negative / Urobili: 1   Blood: x / Protein: 30 mg/dL / Nitrite: Negative   Leuk Esterase: Negative / RBC: 6-10 /HPF / WBC Negative   Sq Epi: x / Non Sq Epi: Few / Bacteria: Few              RADIOLOGY & ADDITIONAL TESTS:

## 2020-07-29 NOTE — PROGRESS NOTE ADULT - ASSESSMENT
73 yo male PMH HTN, HLD, rapid HR (on Cardizem, no AC) herniated lumbar disc presents to ED s/p fall found to have acute mildly displaced left femoral neck fracture. Plan for OR for operative fixation left hip with hemiarthroplasty. Cardiology consulted for Cardiac optimization pre/post op.     s/p L hip NAZ  -W/O cardiac complications  -can hold ASA, resume post procedure when okay with ortho   -ortho following     Tachycardia  -Stable  - HR: 53 (07-29-20 @ 05:07) (53 - 79)  -CW cardizem CD    HTN  - Continue cardizem  -CW enalapril 20 mg po daily  - No past TTE on file. Unremarkable ECHO and stress last year per patient     htn  -continue statin     VTE ppx  - cw enoxaparin   -per primary team     -Monitor and replete lytes, keep K>4 and Mg >2  - Further cardiac workup will depend on clinical course.   - All other workup per primary team  Thank you for the consult  Will continue to follow  Carter Kyle DNP, ANP-c  Cardiology   Spectra #[id number] [phone] 73 yo male PMH HTN, HLD, rapid HR (on Cardizem, no AC) herniated lumbar disc presents to ED s/p fall found to have acute mildly displaced left femoral neck fracture. Plan for OR for operative fixation left hip with hemiarthroplasty. Cardiology consulted for Cardiac optimization pre/post op.     s/p L hip NAZ  -W/O cardiac complications  -can hold ASA, resume post procedure when okay with ortho   -ortho following     Tachycardia  -Stable  - HR: 53 (07-29-20 @ 05:07) (53 - 79)  -CW cardizem CD    HTN  -PT w/ elevated SBP today  - Continue cardizem  -CW enalapril 20 mg po daily resumed today  - No past TTE on file. Unremarkable ECHO and stress last year per patient     htn  -continue statin     VTE ppx  - cw enoxaparin   -per primary team     -Monitor and replete lytes, keep K>4 and Mg >2  - Further cardiac workup will depend on clinical course.   - All other workup per primary team  Thank you for the consult  Will continue to follow  Carter Kyle DNP, ANP-c  Cardiology   Spectra #[id number] [phone]

## 2020-07-30 LAB
ANION GAP SERPL CALC-SCNC: 5 MMOL/L — SIGNIFICANT CHANGE UP (ref 5–17)
BUN SERPL-MCNC: 37 MG/DL — HIGH (ref 7–23)
CALCIUM SERPL-MCNC: 8.7 MG/DL — SIGNIFICANT CHANGE UP (ref 8.5–10.1)
CHLORIDE SERPL-SCNC: 112 MMOL/L — HIGH (ref 96–108)
CO2 SERPL-SCNC: 26 MMOL/L — SIGNIFICANT CHANGE UP (ref 22–31)
CREAT SERPL-MCNC: 1.5 MG/DL — HIGH (ref 0.5–1.3)
GLUCOSE SERPL-MCNC: 120 MG/DL — HIGH (ref 70–99)
HCT VFR BLD CALC: 26.7 % — LOW (ref 39–50)
HCT VFR BLD CALC: 28.2 % — LOW (ref 39–50)
HGB BLD-MCNC: 8.9 G/DL — LOW (ref 13–17)
HGB BLD-MCNC: 9.6 G/DL — LOW (ref 13–17)
IRON SATN MFR SERPL: 10 % — LOW (ref 16–55)
IRON SATN MFR SERPL: 23 UG/DL — LOW (ref 45–165)
MCHC RBC-ENTMCNC: 30.6 PG — SIGNIFICANT CHANGE UP (ref 27–34)
MCHC RBC-ENTMCNC: 31.2 PG — SIGNIFICANT CHANGE UP (ref 27–34)
MCHC RBC-ENTMCNC: 33.3 GM/DL — SIGNIFICANT CHANGE UP (ref 32–36)
MCHC RBC-ENTMCNC: 34 GM/DL — SIGNIFICANT CHANGE UP (ref 32–36)
MCV RBC AUTO: 91.6 FL — SIGNIFICANT CHANGE UP (ref 80–100)
MCV RBC AUTO: 91.8 FL — SIGNIFICANT CHANGE UP (ref 80–100)
NRBC # BLD: 0 /100 WBCS — SIGNIFICANT CHANGE UP (ref 0–0)
NRBC # BLD: 0 /100 WBCS — SIGNIFICANT CHANGE UP (ref 0–0)
PLATELET # BLD AUTO: 167 K/UL — SIGNIFICANT CHANGE UP (ref 150–400)
PLATELET # BLD AUTO: 187 K/UL — SIGNIFICANT CHANGE UP (ref 150–400)
POTASSIUM SERPL-MCNC: 4.5 MMOL/L — SIGNIFICANT CHANGE UP (ref 3.5–5.3)
POTASSIUM SERPL-SCNC: 4.5 MMOL/L — SIGNIFICANT CHANGE UP (ref 3.5–5.3)
RBC # BLD: 2.91 M/UL — LOW (ref 4.2–5.8)
RBC # BLD: 3.08 M/UL — LOW (ref 4.2–5.8)
RBC # BLD: 3.08 M/UL — LOW (ref 4.2–5.8)
RBC # FLD: 13.2 % — SIGNIFICANT CHANGE UP (ref 10.3–14.5)
RBC # FLD: 13.4 % — SIGNIFICANT CHANGE UP (ref 10.3–14.5)
RETICS #: 59.4 K/UL — SIGNIFICANT CHANGE UP (ref 25–125)
RETICS/RBC NFR: 1.9 % — SIGNIFICANT CHANGE UP (ref 0.5–2.5)
SODIUM SERPL-SCNC: 143 MMOL/L — SIGNIFICANT CHANGE UP (ref 135–145)
TIBC SERPL-MCNC: 225 UG/DL — SIGNIFICANT CHANGE UP (ref 220–430)
TRANSFERRIN SERPL-MCNC: 168 MG/DL — LOW (ref 200–360)
UIBC SERPL-MCNC: 202 UG/DL — SIGNIFICANT CHANGE UP (ref 110–370)
WBC # BLD: 10.51 K/UL — HIGH (ref 3.8–10.5)
WBC # BLD: 11.62 K/UL — HIGH (ref 3.8–10.5)
WBC # FLD AUTO: 10.51 K/UL — HIGH (ref 3.8–10.5)
WBC # FLD AUTO: 11.62 K/UL — HIGH (ref 3.8–10.5)

## 2020-07-30 PROCEDURE — 99233 SBSQ HOSP IP/OBS HIGH 50: CPT

## 2020-07-30 PROCEDURE — 99232 SBSQ HOSP IP/OBS MODERATE 35: CPT

## 2020-07-30 RX ORDER — ALPRAZOLAM 0.25 MG
0.25 TABLET ORAL ONCE
Refills: 0 | Status: DISCONTINUED | OUTPATIENT
Start: 2020-07-30 | End: 2020-07-30

## 2020-07-30 RX ADMIN — Medication 650 MILLIGRAM(S): at 14:11

## 2020-07-30 RX ADMIN — Medication 650 MILLIGRAM(S): at 13:11

## 2020-07-30 RX ADMIN — Medication 650 MILLIGRAM(S): at 05:55

## 2020-07-30 RX ADMIN — Medication 650 MILLIGRAM(S): at 17:43

## 2020-07-30 RX ADMIN — PANTOPRAZOLE SODIUM 40 MILLIGRAM(S): 20 TABLET, DELAYED RELEASE ORAL at 05:55

## 2020-07-30 RX ADMIN — ENOXAPARIN SODIUM 40 MILLIGRAM(S): 100 INJECTION SUBCUTANEOUS at 00:03

## 2020-07-30 RX ADMIN — Medication 650 MILLIGRAM(S): at 00:03

## 2020-07-30 RX ADMIN — Medication 650 MILLIGRAM(S): at 00:04

## 2020-07-30 RX ADMIN — ATORVASTATIN CALCIUM 20 MILLIGRAM(S): 80 TABLET, FILM COATED ORAL at 21:13

## 2020-07-30 RX ADMIN — Medication 0.25 MILLIGRAM(S): at 21:13

## 2020-07-30 RX ADMIN — Medication 20 MILLIGRAM(S): at 05:54

## 2020-07-30 RX ADMIN — Medication 1 TABLET(S): at 13:11

## 2020-07-30 RX ADMIN — Medication 650 MILLIGRAM(S): at 05:54

## 2020-07-30 RX ADMIN — Medication 81 MILLIGRAM(S): at 13:11

## 2020-07-30 RX ADMIN — Medication 120 MILLIGRAM(S): at 05:54

## 2020-07-30 RX ADMIN — BENZOCAINE AND MENTHOL 1 LOZENGE: 5; 1 LIQUID ORAL at 17:42

## 2020-07-30 RX ADMIN — POLYETHYLENE GLYCOL 3350 17 GRAM(S): 17 POWDER, FOR SOLUTION ORAL at 13:11

## 2020-07-30 NOTE — PROGRESS NOTE ADULT - ASSESSMENT
DONA on CKD Stage 3  HTN  L Femoral Neck Fx      -DONA likely 2/2 NSAID usage combined with decreased PO intake  -urine studies reviewed showing a FENA < 1%; prerenal disease  - IVF not needed, encourage PO intake  -Renal function is improved well; creatinine is stable 1.5   -AVOID NSAIDS FOR PAIN  -BP uncontrolled; ACEi restarted, monitor hemodynamics   - ortho recs noted.       Thank you DONA on CKD Stage 3  HTN  L Femoral Neck Fx      -DONA likely 2/2 NSAID usage combined with decreased PO intake  -Urine studies reviewed showing a FENA < 1%; prerenal disease  -IVF not needed, encourage PO intake  -Renal function improved well; creatinine is stable 1.5, Likely his baseline   -AVOID NSAIDS FOR PAIN  -BP improving control; ACEi adjusted, monitor renal tolerance  -Ortho recs noted.       Thank you

## 2020-07-30 NOTE — CONSULT NOTE ADULT - SUBJECTIVE AND OBJECTIVE BOX
Patient is a 74y old  Male who presents with a chief complaint of L Femoral neck fracture (30 Jul 2020 09:05)      HPI:  Pt is a 73 yo M presenting from home after a fall resulting in L Femoral neck fracture. PMH HTN, HLD, "fast" HR.   Pt seen and examined at bedside.   He states that last night he was walking when all of a sudden his L leg buckled at the knee causing him to fall. He denies any head traume or LOC. He states that his leg simply gave out. He denies any assoc focal leg weakness, numbness or tingling. He has L hip pain, worse with movement, sharp, currently 4/10 in severity. No radiation elsewhere. He has no other complaints at this time.   Denies past hx of MI/TIA/CVA/CHF/DM2. He has never had any adverse reaction to anesthesia. no family hx of adverse reactions to anesthesia. He has no other complaints at this time. He can ambulate 2 blocks without associated chest pain or SOB.   Of note he was noted to have DONA and reports having taken Motrin for the past few days every 4-6hrs. (27 Jul 2020 18:23)       ROS:  Negative except for:    PAST MEDICAL & SURGICAL HISTORY:  Hyperlipidemia  Hypertension  Hyperlipidemia  Hypertension  H/O hernia repair      SOCIAL HISTORY:    FAMILY HISTORY:      MEDICATIONS  (STANDING):  acetaminophen   Tablet .. 650 milliGRAM(s) Oral every 6 hours  aspirin  chewable 81 milliGRAM(s) Oral daily  atorvastatin 20 milliGRAM(s) Oral at bedtime  BUpivacaine liposome 1.3% Injectable 20 milliLiter(s) Local Injection once  diltiazem    milliGRAM(s) Oral daily  enalapril 20 milliGRAM(s) Oral daily  enoxaparin Injectable 40 milliGRAM(s) SubCutaneous every 24 hours  multivitamin 1 Tablet(s) Oral daily  pantoprazole    Tablet 40 milliGRAM(s) Oral before breakfast  polyethylene glycol 3350 17 Gram(s) Oral daily  sodium chloride 0.9%. 1000 milliLiter(s) (100 mL/Hr) IV Continuous <Continuous>    MEDICATIONS  (PRN):  ALPRAZolam 0.25 milliGRAM(s) Oral once PRN anxiety  aluminum hydroxide/magnesium hydroxide/simethicone Suspension 30 milliLiter(s) Oral four times a day PRN Indigestion  benzocaine 15 mG/menthol 3.6 mG (Sugar-Free) Lozenge 1 Lozenge Oral three times a day PRN Sore Throat  bisacodyl Suppository 10 milliGRAM(s) Rectal daily PRN If no bowel movement by POD#2  hydrALAZINE 25 milliGRAM(s) Oral every 6 hours PRN Systolic blood pressure > 160  ondansetron Injectable 4 milliGRAM(s) IV Push every 6 hours PRN Nausea and/or Vomiting  senna 2 Tablet(s) Oral at bedtime PRN Constipation  traMADol 50 milliGRAM(s) Oral every 6 hours PRN Severe Pain (7 - 10)      Allergies    No Known Allergies    Intolerances        Vital Signs Last 24 Hrs  T(C): 36.8 (30 Jul 2020 12:56), Max: 37.3 (29 Jul 2020 22:25)  T(F): 98.3 (30 Jul 2020 12:56), Max: 99.2 (29 Jul 2020 22:25)  HR: 68 (30 Jul 2020 12:56) (64 - 76)  BP: 147/61 (30 Jul 2020 12:56) (136/74 - 154/81)  BP(mean): --  RR: 18 (30 Jul 2020 12:56) (18 - 18)  SpO2: 98% (30 Jul 2020 12:56) (96% - 98%)    PHYSICAL EXAM  General: adult in NAD  HEENT: clear oropharynx, anicteric sclera, pink conjunctivae  Neck: supple  CV: normal S1S2 with no murmur rubs or gallops  Lungs: clear to auscultation, no wheezes, no rhales  Abdomen: soft non-tender non-distended, no hepato/splenomegaly  Ext: no clubbing cyanosis or edema.  left hip area. dressing clear and dry without ecchymoses or swelling  Skin: no rashes and no petichiae  Neuro: alert and oriented X3 no focal deficits      LABS:    CBC Full  -  ( 30 Jul 2020 07:14 )  WBC Count : 11.62 K/uL  RBC Count : 2.91 M/uL  Hemoglobin : 8.9 g/dL  Hematocrit : 26.7 %  Platelet Count - Automated : 167 K/uL  Mean Cell Volume : 91.8 fl  Mean Cell Hemoglobin : 30.6 pg  Mean Cell Hemoglobin Concentration : 33.3 gm/dL  Auto Neutrophil # : x  Auto Lymphocyte # : x  Auto Monocyte # : x  Auto Eosinophil # : x  Auto Basophil # : x  Auto Neutrophil % : x  Auto Lymphocyte % : x  Auto Monocyte % : x  Auto Eosinophil % : x  Auto Basophil % : x    07-30    143  |  112<H>  |  37<H>  ----------------------------<  120<H>  4.5   |  26  |  1.50<H>    Ca    8.7      30 Jul 2020 07:14                BLOOD SMEAR INTERPRETATION:    RADIOLOGY & ADDITIONAL STUDIES:

## 2020-07-30 NOTE — PROGRESS NOTE ADULT - SUBJECTIVE AND OBJECTIVE BOX
Patient is a 74y old  Male who presents with a chief complaint of L Femoral neck fracture (30 Jul 2020 14:18)       HPI:  Pt is a 75 yo M presenting from home after a fall resulting in L Femoral neck fracture. PMH HTN, HLD, "fast" HR.   Pt seen and examined at bedside.   He states that last night he was walking when all of a sudden his L leg buckled at the knee causing him to fall. He denies any head traume or LOC. He states that his leg simply gave out. He denies any assoc focal leg weakness, numbness or tingling. He has L hip pain, worse with movement, sharp, currently 4/10 in severity. No radiation elsewhere. He has no other complaints at this time.   Denies past hx of MI/TIA/CVA/CHF/DM2. He has never had any adverse reaction to anesthesia. no family hx of adverse reactions to anesthesia. He has no other complaints at this time. He can ambulate 2 blocks without associated chest pain or SOB.   Of note he was noted to have DONA and reports having taken Motrin for the past few days every 4-6hrs. (27 Jul 2020 18:23)       PAST MEDICAL & SURGICAL HISTORY:  Hyperlipidemia  Hypertension  Hyperlipidemia  Hypertension  H/O hernia repair       FAMILY HISTORY:  NC    Social History:Non smoker    MEDICATIONS  (STANDING):  acetaminophen   Tablet .. 650 milliGRAM(s) Oral every 6 hours  aspirin  chewable 81 milliGRAM(s) Oral daily  atorvastatin 20 milliGRAM(s) Oral at bedtime  BUpivacaine liposome 1.3% Injectable 20 milliLiter(s) Local Injection once  diltiazem    milliGRAM(s) Oral daily  enalapril 20 milliGRAM(s) Oral daily  enoxaparin Injectable 40 milliGRAM(s) SubCutaneous every 24 hours  multivitamin 1 Tablet(s) Oral daily  pantoprazole    Tablet 40 milliGRAM(s) Oral before breakfast  polyethylene glycol 3350 17 Gram(s) Oral daily  sodium chloride 0.9%. 1000 milliLiter(s) (100 mL/Hr) IV Continuous <Continuous>    MEDICATIONS  (PRN):  ALPRAZolam 0.25 milliGRAM(s) Oral once PRN anxiety  aluminum hydroxide/magnesium hydroxide/simethicone Suspension 30 milliLiter(s) Oral four times a day PRN Indigestion  benzocaine 15 mG/menthol 3.6 mG (Sugar-Free) Lozenge 1 Lozenge Oral three times a day PRN Sore Throat  bisacodyl Suppository 10 milliGRAM(s) Rectal daily PRN If no bowel movement by POD#2  hydrALAZINE 25 milliGRAM(s) Oral every 6 hours PRN Systolic blood pressure > 160  ondansetron Injectable 4 milliGRAM(s) IV Push every 6 hours PRN Nausea and/or Vomiting  senna 2 Tablet(s) Oral at bedtime PRN Constipation  traMADol 50 milliGRAM(s) Oral every 6 hours PRN Severe Pain (7 - 10)   Meds reviewed    Allergies    No Known Allergies    Intolerances    Interval HPI: Patient seen and examined at bedside. s/p hemoarthroplasty POD 3.     REVIEW OF SYSTEMS:      ENMT:  No difficulty hearing, tinnitus, vertigo; No sinus or throat pain  NECK: No pain or stiffness  BREASTS: No pain, masses, or nipple discharge  RESPIRATORY: No SOB. No wheeze. No PONCE  CARDIOVASCULAR: No chest pain, palpitations, dizziness,   GASTROINTESTINAL: No abdominal or epigastric pain. No nausea, vomiting, or hematemesis; No diarrhea or constipation.   GENITOURINARY: No dysuria, frequency, hematuria, or incontinence  NEUROLOGICAL: No headaches        Vital Signs Last 24 Hrs  T(C): 36.8 (30 Jul 2020 12:56), Max: 37.3 (29 Jul 2020 22:25)  T(F): 98.3 (30 Jul 2020 12:56), Max: 99.2 (29 Jul 2020 22:25)  HR: 68 (30 Jul 2020 12:56) (64 - 76)  BP: 147/61 (30 Jul 2020 12:56) (136/74 - 154/81)  BP(mean): --  RR: 18 (30 Jul 2020 12:56) (18 - 18)  SpO2: 98% (30 Jul 2020 12:56) (96% - 98%)  Daily     Daily     PHYSICAL EXAM:    GENERAL: NAD  HEAD:  Atraumatic, Normocephalic  EYES: EOMI, PERRLA, conjunctiva and sclera clear  ENMT: No tonsillar erythema, exudates, or enlargement; Moist mucous membranes, Good dentition, No lesions  NECK: Supple, neck  veins full  NERVOUS SYSTEM:  Alert & Oriented X3, Good concentration; Motor Strength wnl upper and lower extremities  CHEST/LUNG: Clear to percussion bilaterally; No rales, rhonchi, wheezing, or rubs  HEART: Regular rate and rhythm; No murmurs, rubs, or gallops  ABDOMEN: Soft, Nontender, Nondistended; Bowel sounds present, body wall and flank edema  EXTREMITIES:  no Edema        LABS:                        8.9    11.62 )-----------( 167      ( 30 Jul 2020 07:14 )             26.7     07-30    143  |  112<H>  |  37<H>  ----------------------------<  120<H>  4.5   |  26  |  1.50<H>    Ca    8.7      30 Jul 2020 07:14                  RADIOLOGY & ADDITIONAL TESTS: Patient is a 74y old  Male who presents with a chief complaint of L Femoral neck fracture (30 Jul 2020 14:18)       HPI:  Pt is a 73 yo M presenting from home after a fall resulting in L Femoral neck fracture. PMH HTN, HLD, "fast" HR.   Pt seen and examined at bedside.   He states that last night he was walking when all of a sudden his L leg buckled at the knee causing him to fall. He denies any head traume or LOC. He states that his leg simply gave out. He denies any assoc focal leg weakness, numbness or tingling. He has L hip pain, worse with movement, sharp, currently 4/10 in severity. No radiation elsewhere. He has no other complaints at this time.   Denies past hx of MI/TIA/CVA/CHF/DM2. He has never had any adverse reaction to anesthesia. no family hx of adverse reactions to anesthesia. He has no other complaints at this time. He can ambulate 2 blocks without associated chest pain or SOB.   Of note he was noted to have DONA and reports having taken Motrin for the past few days every 4-6hrs. (27 Jul 2020 18:23)     No new complaints today.      PAST MEDICAL & SURGICAL HISTORY:  Hyperlipidemia  Hypertension  Hyperlipidemia  Hypertension  H/O hernia repair       FAMILY HISTORY:  NC    Social History:Non smoker    MEDICATIONS  (STANDING):  acetaminophen   Tablet .. 650 milliGRAM(s) Oral every 6 hours  aspirin  chewable 81 milliGRAM(s) Oral daily  atorvastatin 20 milliGRAM(s) Oral at bedtime  BUpivacaine liposome 1.3% Injectable 20 milliLiter(s) Local Injection once  diltiazem    milliGRAM(s) Oral daily  enalapril 20 milliGRAM(s) Oral daily  enoxaparin Injectable 40 milliGRAM(s) SubCutaneous every 24 hours  multivitamin 1 Tablet(s) Oral daily  pantoprazole    Tablet 40 milliGRAM(s) Oral before breakfast  polyethylene glycol 3350 17 Gram(s) Oral daily  sodium chloride 0.9%. 1000 milliLiter(s) (100 mL/Hr) IV Continuous <Continuous>    MEDICATIONS  (PRN):  ALPRAZolam 0.25 milliGRAM(s) Oral once PRN anxiety  aluminum hydroxide/magnesium hydroxide/simethicone Suspension 30 milliLiter(s) Oral four times a day PRN Indigestion  benzocaine 15 mG/menthol 3.6 mG (Sugar-Free) Lozenge 1 Lozenge Oral three times a day PRN Sore Throat  bisacodyl Suppository 10 milliGRAM(s) Rectal daily PRN If no bowel movement by POD#2  hydrALAZINE 25 milliGRAM(s) Oral every 6 hours PRN Systolic blood pressure > 160  ondansetron Injectable 4 milliGRAM(s) IV Push every 6 hours PRN Nausea and/or Vomiting  senna 2 Tablet(s) Oral at bedtime PRN Constipation  traMADol 50 milliGRAM(s) Oral every 6 hours PRN Severe Pain (7 - 10)   Meds reviewed    Allergies    No Known Allergies    Intolerances    Interval HPI: Patient seen and examined at bedside. s/p hemoarthroplasty POD 3.     REVIEW OF SYSTEMS:      ENMT:  No difficulty hearing, tinnitus, vertigo; No sinus or throat pain  NECK: No pain or stiffness  BREASTS: No pain, masses, or nipple discharge  RESPIRATORY: No SOB. No wheeze. No PONCE  CARDIOVASCULAR: No chest pain, palpitations, dizziness,   GASTROINTESTINAL: No abdominal or epigastric pain. No nausea, vomiting, or hematemesis; No diarrhea or constipation.   GENITOURINARY: No dysuria, frequency, hematuria, or incontinence  NEUROLOGICAL: No headaches        Vital Signs Last 24 Hrs  T(C): 36.8 (30 Jul 2020 12:56), Max: 37.3 (29 Jul 2020 22:25)  T(F): 98.3 (30 Jul 2020 12:56), Max: 99.2 (29 Jul 2020 22:25)  HR: 68 (30 Jul 2020 12:56) (64 - 76)  BP: 147/61 (30 Jul 2020 12:56) (136/74 - 154/81)  BP(mean): --  RR: 18 (30 Jul 2020 12:56) (18 - 18)  SpO2: 98% (30 Jul 2020 12:56) (96% - 98%)  Daily     Daily     PHYSICAL EXAM:    GENERAL: NAD  HEAD:  Atraumatic, Normocephalic  EYES: EOMI, PERRLA, conjunctiva and sclera clear  ENMT: No tonsillar erythema, exudates, or enlargement; Moist mucous membranes, Good dentition, No lesions  NECK: Supple, neck  veins full  NERVOUS SYSTEM:  Alert & Oriented X3, Good concentration; Motor Strength wnl upper and lower extremities  CHEST/LUNG: Clear to percussion bilaterally; No rales, rhonchi, wheezing, or rubs  HEART: Regular rate and rhythm; No murmurs, rubs, or gallops  ABDOMEN: Soft, Nontender, Nondistended; Bowel sounds present, body wall and flank edema  EXTREMITIES:  no Edema        LABS:                        8.9    11.62 )-----------( 167      ( 30 Jul 2020 07:14 )             26.7     07-30    143  |  112<H>  |  37<H>  ----------------------------<  120<H>  4.5   |  26  |  1.50<H>    Ca    8.7      30 Jul 2020 07:14                  RADIOLOGY & ADDITIONAL TESTS:

## 2020-07-30 NOTE — PROGRESS NOTE ADULT - PROBLEM SELECTOR PLAN 1
patient is post-op day 1  -mgmt per ortho team:   Pain control/analgesia  -Inc spirometry  -Venodynes/foot pumps  -F/U AM Labs  -PT/OT/WBAT with a walker x 6 weeks (due to poor bone quality intraoperatively)  -Antibiotic  -Anticoagulation  -Hip precautions    -pain control  -avoid NSAID's due to DONA
patient is post-op day 2  -mgmt per ortho team:   Pain control/analgesia  -Inc spirometry  -Venodynes/foot pumps  -F/U AM Labs  -PT/OT/WBAT with a walker x 6 weeks (due to poor bone quality intraoperatively)  -Antibiotic  -Anticoagulation  -Hip precautions  -pain control  -avoid NSAID's due to DONA

## 2020-07-30 NOTE — PROGRESS NOTE ADULT - PROBLEM SELECTOR PLAN 4
chronic, stable  pain control before bp med adjustments  Continue Cardizem, enalapril and hydralazine prn with hold parameters.
chronic, slightly elevated  pain control before bp med adjustments  Continue Cardizem, enalapril and hydralazine prn with hold parameters.

## 2020-07-30 NOTE — PROGRESS NOTE ADULT - SUBJECTIVE AND OBJECTIVE BOX
[facility] -- Billy Tejada, Angel Caballero, Luisito Culp Savella  Office # [phone]      Follow Up:    htn hld post op evaluation   Subjective/Observations:     No events overnight resting comfortably in bed.  No complaints of chest pain, dyspnea, or palpitations reported. No signs of orthopnea or PND.    REVIEW OF SYSTEMS: All other review of systems is negative unless indicated above    PAST MEDICAL & SURGICAL HISTORY:  Hyperlipidemia  Hypertension  Hyperlipidemia  Hypertension  H/O hernia repair      MEDICATIONS  (STANDING):  acetaminophen   Tablet .. 650 milliGRAM(s) Oral every 6 hours  aspirin  chewable 81 milliGRAM(s) Oral daily  atorvastatin 20 milliGRAM(s) Oral at bedtime  BUpivacaine liposome 1.3% Injectable 20 milliLiter(s) Local Injection once  diltiazem    milliGRAM(s) Oral daily  enalapril 20 milliGRAM(s) Oral daily  enoxaparin Injectable 40 milliGRAM(s) SubCutaneous every 24 hours  multivitamin 1 Tablet(s) Oral daily  pantoprazole    Tablet 40 milliGRAM(s) Oral before breakfast  polyethylene glycol 3350 17 Gram(s) Oral daily  sodium chloride 0.9%. 1000 milliLiter(s) (100 mL/Hr) IV Continuous <Continuous>    MEDICATIONS  (PRN):  aluminum hydroxide/magnesium hydroxide/simethicone Suspension 30 milliLiter(s) Oral four times a day PRN Indigestion  benzocaine 15 mG/menthol 3.6 mG (Sugar-Free) Lozenge 1 Lozenge Oral three times a day PRN Sore Throat  bisacodyl Suppository 10 milliGRAM(s) Rectal daily PRN If no bowel movement by POD#2  hydrALAZINE 25 milliGRAM(s) Oral every 6 hours PRN Systolic blood pressure > 160  ondansetron Injectable 4 milliGRAM(s) IV Push every 6 hours PRN Nausea and/or Vomiting  senna 2 Tablet(s) Oral at bedtime PRN Constipation  traMADol 50 milliGRAM(s) Oral every 6 hours PRN Severe Pain (7 - 10)      Allergies    No Known Allergies    Intolerances        Vital Signs Last 24 Hrs  T(C): 36.6 (30 Jul 2020 05:23), Max: 37.3 (29 Jul 2020 22:25)  T(F): 97.9 (30 Jul 2020 05:23), Max: 99.2 (29 Jul 2020 22:25)  HR: 64 (30 Jul 2020 05:23) (59 - 76)  BP: 146/75 (30 Jul 2020 05:23) (136/74 - 166/66)  BP(mean): --  RR: 18 (30 Jul 2020 05:23) (18 - 18)  SpO2: 98% (30 Jul 2020 05:23) (96% - 98%)    I&O's Summary    29 Jul 2020 07:01  -  30 Jul 2020 07:00  --------------------------------------------------------  IN: 0 mL / OUT: 950 mL / NET: -950 mL          PHYSICAL EXAM:  TELE: not on tele   Constitutional: NAD, awake and alert,obese   HEENT: Moist Mucous Membranes, Anicteric  Pulmonary: Non-labored, breath sounds are clear bilaterally, No wheezing, crackles or rhonchi  Cardiovascular: Regular, S1 and S2 nl, No murmurs, rubs, gallops or clicks  Gastrointestinal: Bowel Sounds present, soft, nontender.   Lymph: No lymphadenopathy. No peripheral edema.  Skin: No visible rashes or ulcers.  Psych:  Mood & affect appropriate    LABS: All Labs Reviewed:                        8.9    11.62 )-----------( 167      ( 30 Jul 2020 07:14 )             26.7                         10.7   10.28 )-----------( 161      ( 29 Jul 2020 07:18 )             31.1                         11.5   13.18 )-----------( 160      ( 28 Jul 2020 21:31 )             34.8     30 Jul 2020 07:14    143    |  112    |  37     ----------------------------<  120    4.5     |  26     |  1.50   29 Jul 2020 07:18    139    |  108    |  31     ----------------------------<  144    4.6     |  26     |  1.40   28 Jul 2020 21:31    140    |  109    |  26     ----------------------------<  130    4.7     |  24     |  1.40     Ca    8.7        30 Jul 2020 07:14  Ca    8.3        29 Jul 2020 07:18  Ca    8.2        28 Jul 2020 21:31    TPro  7.9    /  Alb  3.9    /  TBili  1.2    /  DBili  x      /  AST  22     /  ALT  20     /  AlkPhos  101    27 Jul 2020 14:18      ECG:  < from: 12 Lead ECG (07.27.20 @ 17:48) >    Ventricular Rate 76 BPM    Atrial Rate 76 BPM    P-R Interval 372 ms    QRS Duration 70 ms    Q-T Interval 398 ms    QTC Calculation(Bezet) 447 ms    P Axis 71 degrees    R Axis -20 degrees    T Axis 14 degrees    Diagnosis Line Sinus rhythm with 1st degree AV block  Otherwise normal ECG        Radiology:  < from: Xray Chest 1 View AP/PA (07.27.20 @ 15:11) >      INTERPRETATION:  CLINICAL INFORMATION:Status post fall with left hip and back pain    TECHNIQUE: AP chest film. No prior studies available for comparison.    FINDINGS:There is no focal consolidation or pleural effusion.  Heart size cannot be accurately evaluated in this projection. The osseous structures are intact.    IMPRESSION: No focal consolidation or pleural effusion.    < end of copied text >

## 2020-07-30 NOTE — PROGRESS NOTE ADULT - ASSESSMENT
74 year old male PMH HTN, HLD, rapid HR (on Cardizem, no AC) herniated lumbar disc presents to ED s/p fall found to have acute mildly displaced left femoral neck fracture   Plan for OR for operative fixation left hip with hemiarthroplasty. Cardiology consulted for Cardiac optimization pre/post op.     s/p L hip NAZ  -tolerated procedure without cardiac complications  -ortho following   -anemia- transfuse as per ortho  -pain control  -PT    HTN  -bp 146/75  - Continue cardizem and enalapril     HLD  -continue statin     VTE ppx  - cw enoxaparin   -per primary team     -Monitor and replete lytes, keep K>4 and Mg >2  - Further cardiac workup will depend on clinical course.   - All other workup per primary team  [place] SHAHIDP-C  Cardiology NP  [id number] [phone]

## 2020-07-30 NOTE — PROGRESS NOTE ADULT - PROBLEM SELECTOR PLAN 6
lovenox 40mg QD for dvt ppx    PT eval: plan rehab likely tomorrow if medically stable
lovenox 40mg QD for dvt ppx    PT eval: plan rehab likely tomorrow if medically stable

## 2020-07-30 NOTE — PROGRESS NOTE ADULT - ASSESSMENT
75 yo M with  PMH HTN, HLD, "fast" HR presenting from home after a fall a/w L Femoral neck fracture  s/p L hip NAZ, POD # 2

## 2020-07-30 NOTE — PROGRESS NOTE ADULT - PROBLEM SELECTOR PLAN 2
-acute, increased  -cr 1.5  - restarted enalapril, if cr increases may need to stop and consider alternate  -apprec renal ongoing recs  - changed hydralazine po [id number] prn  -monitor BP closely as he is hypertensive  -monitor BMP  -apprec renal recs

## 2020-07-30 NOTE — PROGRESS NOTE ADULT - SUBJECTIVE AND OBJECTIVE BOX
Patient is a 74y old  Male who presents with a chief complaint of L Femoral neck fracture (30 Jul 2020 14:29)      INTERVAL HPI: Pt seen and examined. States that he is feeling well overall, endorses has a history of internal hemorrhoids but denies any other acute complaints at this time.     OVERNIGHT EVENTS: none noted  T(F): 98.3 (07-30-20 @ 12:56), Max: 99.2 (07-29-20 @ 22:25)  HR: 68 (07-30-20 @ 12:56) (64 - 76)  BP: 147/61 (07-30-20 @ 12:56) (136/74 - 154/81)  RR: 18 (07-30-20 @ 12:56) (18 - 18)  SpO2: 98% (07-30-20 @ 12:56) (96% - 98%)  I&O's Summary    29 Jul 2020 07:01  -  30 Jul 2020 07:00  --------------------------------------------------------  IN: 0 mL / OUT: 950 mL / NET: -950 mL        REVIEW OF SYSTEMS:  CONSTITUTIONAL: No fever, weight loss, or fatigue  RESPIRATORY: No cough, wheezing, chills or hemoptysis; No shortness of breath  CARDIOVASCULAR: No chest pain, palpitations, dizziness, or leg swelling  GASTROINTESTINAL: No abdominal or epigastric pain. No nausea, vomiting, or hematemesis; No diarrhea or constipation. No melena or hematochezia.  GENITOURINARY: No dysuria, frequency, hematuria, or incontinence  NEUROLOGICAL: No headaches, memory loss, loss of strength, numbness, or tremors  SKIN: No itching, burning, rashes, or lesions   MUSCULOSKELETAL: No joint pain or swelling; No muscle, back, or extremity pain  PSYCHIATRIC: No depression, anxiety, mood swings, or difficulty sleeping  HEME/LYMPH: No easy bruising, or bleeding gums      PHYSICAL EXAM:  GENERAL: NAD, well-groomed, well-developed  NERVOUS SYSTEM:  Alert & Oriented X3, Good concentration; Motor Strength 4/5 B/L upper and lower extremities except LLE 3/5  CHEST/LUNG: Clear to auscultation bilaterally; No rales, rhonchi, wheezing, or rubs  HEART: Regular rate and rhythm; No murmurs, rubs, or gallops  ABDOMEN: Soft, Nontender, Nondistended; Bowel sounds present  EXTREMITIES:  2+ Peripheral Pulses, No clubbing, cyanosis, or edema, L hip dressing c/d/i, in leg brace  SKIN: No rashes or lesions    LABS:                        8.9    11.62 )-----------( 167      ( 30 Jul 2020 07:14 )             26.7     07-30    143  |  112<H>  |  37<H>  ----------------------------<  120<H>  4.5   |  26  |  1.50<H>    Ca    8.7      30 Jul 2020 07:14          CAPILLARY BLOOD GLUCOSE                  MEDICATIONS  (STANDING):  acetaminophen   Tablet .. 650 milliGRAM(s) Oral every 6 hours  aspirin  chewable 81 milliGRAM(s) Oral daily  atorvastatin 20 milliGRAM(s) Oral at bedtime  BUpivacaine liposome 1.3% Injectable 20 milliLiter(s) Local Injection once  diltiazem    milliGRAM(s) Oral daily  enalapril 20 milliGRAM(s) Oral daily  enoxaparin Injectable 40 milliGRAM(s) SubCutaneous every 24 hours  multivitamin 1 Tablet(s) Oral daily  pantoprazole    Tablet 40 milliGRAM(s) Oral before breakfast  polyethylene glycol 3350 17 Gram(s) Oral daily  sodium chloride 0.9%. 1000 milliLiter(s) (100 mL/Hr) IV Continuous <Continuous>    MEDICATIONS  (PRN):  ALPRAZolam 0.25 milliGRAM(s) Oral once PRN anxiety  aluminum hydroxide/magnesium hydroxide/simethicone Suspension 30 milliLiter(s) Oral four times a day PRN Indigestion  benzocaine 15 mG/menthol 3.6 mG (Sugar-Free) Lozenge 1 Lozenge Oral three times a day PRN Sore Throat  bisacodyl Suppository 10 milliGRAM(s) Rectal daily PRN If no bowel movement by POD#2  hydrALAZINE 25 milliGRAM(s) Oral every 6 hours PRN Systolic blood pressure > 160  ondansetron Injectable 4 milliGRAM(s) IV Push every 6 hours PRN Nausea and/or Vomiting  senna 2 Tablet(s) Oral at bedtime PRN Constipation  traMADol 50 milliGRAM(s) Oral every 6 hours PRN Severe Pain (7 - 10)

## 2020-07-30 NOTE — CONSULT NOTE ADULT - ASSESSMENT
Anemia multifactorial in etiology related to acute event, renal insufficiency as well as potentially being exacerbated by anticoagulation and antiplatelet therapy.      Recommendations"  1.  follow CBC   2.  can continue asa and lovenox and observe.  this may still be consistent with post op care.  does not require a transfusion and can observe.  3.  to re-evaluate CBC in am and decide on need of transfusion and potential discharge and additional recommendations  discussed with patient and Dr. Buchanan

## 2020-07-30 NOTE — PROGRESS NOTE ADULT - SUBJECTIVE AND OBJECTIVE BOX
7/30 - Patient is seen and examined at bedside. Denies CP/SOB/Dizziness/N/V/D/HA. Pain is controlled.     Vital Signs Last 24 Hrs  T(C): 36.6 (30 Jul 2020 05:23), Max: 37.3 (29 Jul 2020 22:25)  T(F): 97.9 (30 Jul 2020 05:23), Max: 99.2 (29 Jul 2020 22:25)  HR: 64 (30 Jul 2020 05:23) (59 - 76)  BP: 146/75 (30 Jul 2020 05:23) (136/74 - 166/66)  BP(mean): --  RR: 18 (30 Jul 2020 05:23) (18 - 18)  SpO2: 98% (30 Jul 2020 05:23) (96% - 98%)      GEN: NAD: AAOx3; CNS grossly intact; no focal deficits  LLE: Dressing C/D/I. abduction pillow in place  Motor intact + EHL/FHL/TA/GS in the BL LE. Sensation is grossly intact distal . Extremity warm. Compartments are soft. DP 1+    Vital Signs Last 24 Hrs  T(C): 36.6 (30 Jul 2020 05:23), Max: 37.3 (29 Jul 2020 22:25)  T(F): 97.9 (30 Jul 2020 05:23), Max: 99.2 (29 Jul 2020 22:25)  HR: 64 (30 Jul 2020 05:23) (59 - 76)  BP: 146/75 (30 Jul 2020 05:23) (136/74 - 166/66)  BP(mean): --  RR: 18 (30 Jul 2020 05:23) (18 - 18)  SpO2: 98% (30 Jul 2020 05:23) (96% - 98%)    LABS:                        10.7   10.28 )-----------( 161      ( 29 Jul 2020 07:18 )             31.1     29 Jul 2020 07:18    139    |  108    |  31     ----------------------------<  144    4.6     |  26     |  1.40     Ca    8.3        29 Jul 2020 07:18

## 2020-07-30 NOTE — PROGRESS NOTE ADULT - ASSESSMENT
A/P: Patient is a 74y y/o Male s/p L hip NAZ, POD # 2    -MRI was recommended this am for lumbar radiculopathy and leg weakness, but states he doesnt want MRI. Previously he dropped his BP in MRI machine and therefore doesnt want to get one now.   -Pain control/analgesia  -Inc spirometry  -Venodynes/foot pumps  -F/U AM Labs  -PT/OT/WBAT with a walker x 6 weeks (due to poor bone quality intraoperatively)  -Antibiotic  -Anticoagulation  -Hip precautions  -Dispo planning for rehab today  -No further acute orthopedic surgical intervention needed at this time  -Discussed with attending who agrees with plan  -Ortho stable for discharge

## 2020-07-30 NOTE — PROGRESS NOTE ADULT - PROBLEM SELECTOR PLAN 3
- admission hgb 15.1 today 8.9   - continue to follow, transfuse if Hgb <7.5  -apprec hemat recs  -check pm cbc and anemia workup labs  -cbc am as well, if stable can be dc to [facility] tomorrow
- pt hbg 10.7, likely 2/2 surgical loss  - continue to follow, transfuse if Hgb <7  - repeat hgb AM

## 2020-07-31 ENCOUNTER — TRANSCRIPTION ENCOUNTER (OUTPATIENT)
Age: 75
End: 2020-07-31

## 2020-07-31 VITALS
DIASTOLIC BLOOD PRESSURE: 74 MMHG | RESPIRATION RATE: 18 BRPM | SYSTOLIC BLOOD PRESSURE: 145 MMHG | TEMPERATURE: 98 F | OXYGEN SATURATION: 95 % | HEART RATE: 70 BPM

## 2020-07-31 DIAGNOSIS — D50.0 IRON DEFICIENCY ANEMIA SECONDARY TO BLOOD LOSS (CHRONIC): ICD-10-CM

## 2020-07-31 PROBLEM — E78.5 HYPERLIPIDEMIA, UNSPECIFIED: Chronic | Status: ACTIVE | Noted: 2020-07-27

## 2020-07-31 PROBLEM — I10 ESSENTIAL (PRIMARY) HYPERTENSION: Chronic | Status: ACTIVE | Noted: 2020-07-27

## 2020-07-31 LAB
ALBUMIN SERPL ELPH-MCNC: 2.5 G/DL — LOW (ref 3.3–5)
ALP SERPL-CCNC: 57 U/L — SIGNIFICANT CHANGE UP (ref 40–120)
ALT FLD-CCNC: 15 U/L — SIGNIFICANT CHANGE UP (ref 12–78)
ANION GAP SERPL CALC-SCNC: 3 MMOL/L — LOW (ref 5–17)
AST SERPL-CCNC: 34 U/L — SIGNIFICANT CHANGE UP (ref 15–37)
BILIRUB SERPL-MCNC: 0.6 MG/DL — SIGNIFICANT CHANGE UP (ref 0.2–1.2)
BUN SERPL-MCNC: 35 MG/DL — HIGH (ref 7–23)
CALCIUM SERPL-MCNC: 8.5 MG/DL — SIGNIFICANT CHANGE UP (ref 8.5–10.1)
CHLORIDE SERPL-SCNC: 113 MMOL/L — HIGH (ref 96–108)
CO2 SERPL-SCNC: 28 MMOL/L — SIGNIFICANT CHANGE UP (ref 22–31)
CREAT SERPL-MCNC: 1.3 MG/DL — SIGNIFICANT CHANGE UP (ref 0.5–1.3)
FERRITIN SERPL-MCNC: 441 NG/ML — HIGH (ref 30–400)
FOLATE SERPL-MCNC: 16 NG/ML — SIGNIFICANT CHANGE UP
GLUCOSE SERPL-MCNC: 105 MG/DL — HIGH (ref 70–99)
HCT VFR BLD CALC: 26.6 % — LOW (ref 39–50)
HGB BLD-MCNC: 9.1 G/DL — LOW (ref 13–17)
MCHC RBC-ENTMCNC: 31.5 PG — SIGNIFICANT CHANGE UP (ref 27–34)
MCHC RBC-ENTMCNC: 34.2 GM/DL — SIGNIFICANT CHANGE UP (ref 32–36)
MCV RBC AUTO: 92 FL — SIGNIFICANT CHANGE UP (ref 80–100)
NRBC # BLD: 0 /100 WBCS — SIGNIFICANT CHANGE UP (ref 0–0)
OB PNL STL: NEGATIVE — SIGNIFICANT CHANGE UP
PLATELET # BLD AUTO: 196 K/UL — SIGNIFICANT CHANGE UP (ref 150–400)
POTASSIUM SERPL-MCNC: 4.3 MMOL/L — SIGNIFICANT CHANGE UP (ref 3.5–5.3)
POTASSIUM SERPL-SCNC: 4.3 MMOL/L — SIGNIFICANT CHANGE UP (ref 3.5–5.3)
PROT SERPL-MCNC: 5.9 G/DL — LOW (ref 6–8.3)
RBC # BLD: 2.89 M/UL — LOW (ref 4.2–5.8)
RBC # FLD: 13.3 % — SIGNIFICANT CHANGE UP (ref 10.3–14.5)
SARS-COV-2 RNA SPEC QL NAA+PROBE: SIGNIFICANT CHANGE UP
SODIUM SERPL-SCNC: 144 MMOL/L — SIGNIFICANT CHANGE UP (ref 135–145)
VIT B12 SERPL-MCNC: 736 PG/ML — SIGNIFICANT CHANGE UP (ref 232–1245)
WBC # BLD: 8.97 K/UL — SIGNIFICANT CHANGE UP (ref 3.8–10.5)
WBC # FLD AUTO: 8.97 K/UL — SIGNIFICANT CHANGE UP (ref 3.8–10.5)

## 2020-07-31 PROCEDURE — 86769 SARS-COV-2 COVID-19 ANTIBODY: CPT

## 2020-07-31 PROCEDURE — 97162 PT EVAL MOD COMPLEX 30 MIN: CPT

## 2020-07-31 PROCEDURE — 88305 TISSUE EXAM BY PATHOLOGIST: CPT

## 2020-07-31 PROCEDURE — 84300 ASSAY OF URINE SODIUM: CPT

## 2020-07-31 PROCEDURE — 97112 NEUROMUSCULAR REEDUCATION: CPT

## 2020-07-31 PROCEDURE — 88311 DECALCIFY TISSUE: CPT

## 2020-07-31 PROCEDURE — 86901 BLOOD TYPING SEROLOGIC RH(D): CPT

## 2020-07-31 PROCEDURE — 82746 ASSAY OF FOLIC ACID SERUM: CPT

## 2020-07-31 PROCEDURE — 97116 GAIT TRAINING THERAPY: CPT

## 2020-07-31 PROCEDURE — 82550 ASSAY OF CK (CPK): CPT

## 2020-07-31 PROCEDURE — 72110 X-RAY EXAM L-2 SPINE 4/>VWS: CPT

## 2020-07-31 PROCEDURE — 80053 COMPREHEN METABOLIC PANEL: CPT

## 2020-07-31 PROCEDURE — 87635 SARS-COV-2 COVID-19 AMP PRB: CPT

## 2020-07-31 PROCEDURE — 85027 COMPLETE CBC AUTOMATED: CPT

## 2020-07-31 PROCEDURE — C1713: CPT

## 2020-07-31 PROCEDURE — 82570 ASSAY OF URINE CREATININE: CPT

## 2020-07-31 PROCEDURE — 81001 URINALYSIS AUTO W/SCOPE: CPT

## 2020-07-31 PROCEDURE — 82607 VITAMIN B-12: CPT

## 2020-07-31 PROCEDURE — 97166 OT EVAL MOD COMPLEX 45 MIN: CPT

## 2020-07-31 PROCEDURE — 84466 ASSAY OF TRANSFERRIN: CPT

## 2020-07-31 PROCEDURE — 71045 X-RAY EXAM CHEST 1 VIEW: CPT

## 2020-07-31 PROCEDURE — 73700 CT LOWER EXTREMITY W/O DYE: CPT

## 2020-07-31 PROCEDURE — 99232 SBSQ HOSP IP/OBS MODERATE 35: CPT

## 2020-07-31 PROCEDURE — 85730 THROMBOPLASTIN TIME PARTIAL: CPT

## 2020-07-31 PROCEDURE — 96375 TX/PRO/DX INJ NEW DRUG ADDON: CPT

## 2020-07-31 PROCEDURE — 72131 CT LUMBAR SPINE W/O DYE: CPT

## 2020-07-31 PROCEDURE — 86900 BLOOD TYPING SEROLOGIC ABO: CPT

## 2020-07-31 PROCEDURE — 99285 EMERGENCY DEPT VISIT HI MDM: CPT | Mod: 25

## 2020-07-31 PROCEDURE — C1889: CPT

## 2020-07-31 PROCEDURE — 85045 AUTOMATED RETICULOCYTE COUNT: CPT

## 2020-07-31 PROCEDURE — 73502 X-RAY EXAM HIP UNI 2-3 VIEWS: CPT

## 2020-07-31 PROCEDURE — 97530 THERAPEUTIC ACTIVITIES: CPT

## 2020-07-31 PROCEDURE — 86850 RBC ANTIBODY SCREEN: CPT

## 2020-07-31 PROCEDURE — 85610 PROTHROMBIN TIME: CPT

## 2020-07-31 PROCEDURE — C1776: CPT

## 2020-07-31 PROCEDURE — 82728 ASSAY OF FERRITIN: CPT

## 2020-07-31 PROCEDURE — 73552 X-RAY EXAM OF FEMUR 2/>: CPT

## 2020-07-31 PROCEDURE — 96374 THER/PROPH/DIAG INJ IV PUSH: CPT

## 2020-07-31 PROCEDURE — 82272 OCCULT BLD FECES 1-3 TESTS: CPT

## 2020-07-31 PROCEDURE — 83540 ASSAY OF IRON: CPT

## 2020-07-31 PROCEDURE — 36415 COLL VENOUS BLD VENIPUNCTURE: CPT

## 2020-07-31 PROCEDURE — 86923 COMPATIBILITY TEST ELECTRIC: CPT

## 2020-07-31 PROCEDURE — 93005 ELECTROCARDIOGRAM TRACING: CPT

## 2020-07-31 PROCEDURE — 80048 BASIC METABOLIC PNL TOTAL CA: CPT

## 2020-07-31 PROCEDURE — 97535 SELF CARE MNGMENT TRAINING: CPT

## 2020-07-31 PROCEDURE — 83550 IRON BINDING TEST: CPT

## 2020-07-31 PROCEDURE — 84540 ASSAY OF URINE/UREA-N: CPT

## 2020-07-31 PROCEDURE — U0003: CPT

## 2020-07-31 PROCEDURE — 96361 HYDRATE IV INFUSION ADD-ON: CPT

## 2020-07-31 RX ORDER — POLYETHYLENE GLYCOL 3350 17 G/17G
17 POWDER, FOR SOLUTION ORAL
Qty: 0 | Refills: 0 | DISCHARGE
Start: 2020-07-31

## 2020-07-31 RX ORDER — TRAMADOL HYDROCHLORIDE 50 MG/1
1 TABLET ORAL
Qty: 0 | Refills: 0 | DISCHARGE
Start: 2020-07-31

## 2020-07-31 RX ORDER — SENNA PLUS 8.6 MG/1
2 TABLET ORAL
Qty: 0 | Refills: 0 | DISCHARGE
Start: 2020-07-31

## 2020-07-31 RX ORDER — PANTOPRAZOLE SODIUM 20 MG/1
1 TABLET, DELAYED RELEASE ORAL
Qty: 0 | Refills: 0 | DISCHARGE
Start: 2020-07-31

## 2020-07-31 RX ORDER — ENOXAPARIN SODIUM 100 MG/ML
40 INJECTION SUBCUTANEOUS
Qty: 0 | Refills: 0 | DISCHARGE
Start: 2020-07-31

## 2020-07-31 RX ADMIN — Medication 650 MILLIGRAM(S): at 14:12

## 2020-07-31 RX ADMIN — PANTOPRAZOLE SODIUM 40 MILLIGRAM(S): 20 TABLET, DELAYED RELEASE ORAL at 05:02

## 2020-07-31 RX ADMIN — Medication 650 MILLIGRAM(S): at 00:10

## 2020-07-31 RX ADMIN — Medication 120 MILLIGRAM(S): at 05:02

## 2020-07-31 RX ADMIN — Medication 650 MILLIGRAM(S): at 05:03

## 2020-07-31 RX ADMIN — Medication 81 MILLIGRAM(S): at 13:13

## 2020-07-31 RX ADMIN — Medication 20 MILLIGRAM(S): at 05:02

## 2020-07-31 RX ADMIN — Medication 650 MILLIGRAM(S): at 18:06

## 2020-07-31 RX ADMIN — Medication 25 MILLIGRAM(S): at 14:17

## 2020-07-31 RX ADMIN — Medication 650 MILLIGRAM(S): at 13:12

## 2020-07-31 RX ADMIN — BENZOCAINE AND MENTHOL 1 LOZENGE: 5; 1 LIQUID ORAL at 15:29

## 2020-07-31 RX ADMIN — Medication 650 MILLIGRAM(S): at 05:02

## 2020-07-31 RX ADMIN — Medication 1 TABLET(S): at 13:12

## 2020-07-31 NOTE — PROGRESS NOTE ADULT - PROBLEM SELECTOR PROBLEM 2
Closed fracture of left hip, initial encounter
DONA (acute kidney injury)
DONA (acute kidney injury)

## 2020-07-31 NOTE — PROGRESS NOTE ADULT - SUBJECTIVE AND OBJECTIVE BOX
[facility] -- Billy Tejada, Angel Caballero, Luisito Kimball, Lauren Sykes: Office # [phone]    Follow Up:  HTN, HLD, post op     Subjective/Observations: Patient seen and examined. Patient awake and alert, resting comfortably in bed. No complaints of chest pain, SOB, LE edema, cough. No signs of orthopnea or PND.    REVIEW OF SYSTEMS: All review of systems is negative for eye, ENT, GI, , allergic, dermatologic, musculoskeletal and neurologic except as described above    PAST MEDICAL & SURGICAL HISTORY:  Hyperlipidemia  Hypertension  Hypertension  Hyperlipidemia  Hypertension  Hypertension  H/O hernia repair    MEDICATIONS  (STANDING):  acetaminophen   Tablet .. 650 milliGRAM(s) Oral every 6 hours  aspirin  chewable 81 milliGRAM(s) Oral daily  atorvastatin 20 milliGRAM(s) Oral at bedtime  BUpivacaine liposome 1.3% Injectable 20 milliLiter(s) Local Injection once  diltiazem    milliGRAM(s) Oral daily  enalapril 20 milliGRAM(s) Oral daily  enoxaparin Injectable 40 milliGRAM(s) SubCutaneous every 24 hours  multivitamin 1 Tablet(s) Oral daily  pantoprazole    Tablet 40 milliGRAM(s) Oral before breakfast  polyethylene glycol 3350 17 Gram(s) Oral daily  sodium chloride 0.9%. 1000 milliLiter(s) (100 mL/Hr) IV Continuous <Continuous>    MEDICATIONS  (PRN):  aluminum hydroxide/magnesium hydroxide/simethicone Suspension 30 milliLiter(s) Oral four times a day PRN Indigestion  benzocaine 15 mG/menthol 3.6 mG (Sugar-Free) Lozenge 1 Lozenge Oral three times a day PRN Sore Throat  bisacodyl Suppository 10 milliGRAM(s) Rectal daily PRN If no bowel movement by POD#2  hydrALAZINE 25 milliGRAM(s) Oral every 6 hours PRN Systolic blood pressure > 160  ondansetron Injectable 4 milliGRAM(s) IV Push every 6 hours PRN Nausea and/or Vomiting  senna 2 Tablet(s) Oral at bedtime PRN Constipation  traMADol 50 milliGRAM(s) Oral every 6 hours PRN Severe Pain (7 - 10)    Allergies  No Known Allergies    Vital Signs Last 24 Hrs  T(C): 37.1 (31 Jul 2020 04:56), Max: 37.1 (31 Jul 2020 04:56)  T(F): 98.7 (31 Jul 2020 04:56), Max: 98.7 (31 Jul 2020 04:56)  HR: 75 (31 Jul 2020 04:56) (52 - 75)  BP: 164/79 (31 Jul 2020 06:14) (147/61 - 186/81)  BP(mean): --  RR: 18 (31 Jul 2020 04:56) (17 - 18)  SpO2: 96% (31 Jul 2020 04:56) (93% - 98%)    I&O's Summary    30 Jul 2020 07:01  -  31 Jul 2020 07:00  --------------------------------------------------------  IN: 0 mL / OUT: 800 mL / NET: -800 mL    31 Jul 2020 07:01  -  31 Jul 2020 10:15  --------------------------------------------------------  IN: 240 mL / OUT: 0 mL / NET: 240 mL    TELE: Not on telemetry   PHYSICAL EXAM:  Appearance: NAD, no distress, alert, Well developed   HEENT: Moist Mucous Membranes, Anicteric  Cardiovascular: Regular rate and rhythm, Normal S1 S2, No JVD, No murmurs, No rubs, gallops or clicks  Respiratory: Non-labored, Clear to auscultation, No rales, No rhonchi, No wheezing.   Gastrointestinal:  Soft, Non-tender, + BS  Neurologic: Non-focal  Skin: Warm and dry, No visible rashes or ulcers, No ecchymosis, No cyanosis  Musculoskeletal: No clubbing, No cyanosis, No joint swelling/tenderness  Psychiatry: Mood & affect appropriate  Lymph: No peripheral edema.     LABS: All Labs Reviewed:                        9.1    8.97  )-----------( 196      ( 31 Jul 2020 06:58 )             26.6                         9.6    10.51 )-----------( 187      ( 30 Jul 2020 16:39 )             28.2                         8.9    11.62 )-----------( 167      ( 30 Jul 2020 07:14 )             26.7     31 Jul 2020 06:58    144    |  113    |  35     ----------------------------<  105    4.3     |  28     |  1.30   30 Jul 2020 07:14    143    |  112    |  37     ----------------------------<  120    4.5     |  26     |  1.50   29 Jul 2020 07:18    139    |  108    |  31     ----------------------------<  144    4.6     |  26     |  1.40     Ca    8.5        31 Jul 2020 06:58  Ca    8.7        30 Jul 2020 07:14  Ca    8.3        29 Jul 2020 07:18    TPro  5.9    /  Alb  2.5    /  TBili  0.6    /  DBili  x      /  AST  34     /  ALT  15     /  AlkPhos  57     31 Jul 2020 06:58  Creatine Kinase, Serum: 217 U/L (07-27-20 @ 14:18)    12 Lead ECG:   Ventricular Rate 76 BPM  Atrial Rate 76 BPM  P-R Interval 372 ms  QRS Duration 70 ms  Q-T Interval 398 ms  QTC Calculation(Bezet) 447 ms  P Axis 71 degrees  R Axis -20 degrees  T Axis 14 degrees  Diagnosis Line Sinus rhythm with 1st degree AV block  Otherwise normal ECG  Confirmed by MACRINA KIMBALL ([id number]) on 7/28/2020 2:24:51 PM (07-27-20 @ 17:48)    < from: Xray Chest 1 View AP/PA (07.27.20 @ 15:11) >  FINDINGS:There is no focal consolidation or pleural effusion.  Heart size cannot be accurately evaluated in this projection. The osseous structures are intact.    IMPRESSION: No focal consolidation or pleural effusion.  < end of copied text >

## 2020-07-31 NOTE — PROGRESS NOTE ADULT - PROVIDER SPECIALTY LIST ADULT
Cardiology
Heme/Onc
Hospitalist
Hospitalist
Nephrology
Orthopedics
Hospitalist

## 2020-07-31 NOTE — PROGRESS NOTE ADULT - ASSESSMENT
A/P: Patient is a 74y y/o Male s/p L hip NAZ, POD # 3    -refusing MRI  -Pain control/analgesia  -Inc spirometry  -Venodynes/foot pumps  -F/U AM Labs  -PT/OT/WBAT with a walker x 6 weeks (due to poor bone quality intraoperatively)  -Antibiotic  -Anticoagulation  -Hip precautions  -Dispo planning for rehab today  -No further acute orthopedic surgical intervention needed at this time  -Discussed with attending who agrees with plan  -Ortho stable for discharge A/P: Patient is a 74y y/o Male s/p L hip NAZ, POD # 3    -Dressing changed today  -refusing MRI  -Pain control/analgesia  -Inc spirometry  -Venodynes/foot pumps  -F/U AM Labs  -PT/OT/WBAT with a walker x 6 weeks (due to poor bone quality intraoperatively)  -Antibiotic  -Anticoagulation  -Hip precautions  -Dispo planning for rehab today  -No further acute orthopedic surgical intervention needed at this time  -Discussed with attending who agrees with plan  -Ortho stable for discharge

## 2020-07-31 NOTE — PROGRESS NOTE ADULT - REASON FOR ADMISSION
L Femoral neck fracture

## 2020-07-31 NOTE — PROGRESS NOTE ADULT - ASSESSMENT
DONA on CKD Stage 3  HTN  L Femoral Neck Fx      -DONA likely 2/2 NSAID usage combined with decreased PO intake  -Urine studies reviewed showing a FENA < 1%; prerenal disease  -IVF not needed, encourage PO intake  -Renal function improved well; creatinine is stable 1.5, Likely his baseline   -AVOID NSAIDS FOR PAIN  -BP improving control; ACEi adjusted hydralazine prn, monitor renal tolerance  -Ortho recs noted.       Thank you DONA on CKD Stage 3  HTN  L Femoral Neck Fx  Anemia      -DONA likely 2/2 NSAID usage combined with decreased PO intake  -Urine studies reviewed showing a FENA < 1%; prerenal disease  -IVF not needed, encourage PO intake  -Renal function improved well; creatinine is stable 1.4, Likely his baseline   -AVOID NSAIDS FOR PAIN  -BP improving control; ACEi adjusted hydralazine prn, monitor renal tolerance  -Ortho recs noted.  -FOBT    Thank you

## 2020-07-31 NOTE — PROGRESS NOTE ADULT - ATTENDING COMMENTS
I personally saw and examined the patient in detail.  I have spoken to the above provider regarding the assessment and plan of care.  I reviewed the above assessment and plan of care, and agree.  I have made changes in the body of the note where appropriate.  Optimized for the OR from a cardiac point of view with no evidence of active ischemic heart disease, decompensated heart failure, severe obstructive valvular disease, or uncontrolled arrhythmia.
Seen/examined. agree with above.
The patient was personally seen and examined, in addition to being examined and evaluated by NP.  All elements of the note were edited where appropriate.
Chart reviewed    Patient seen and examined    Agree with plan as outlined above
Patient seen and examined.  Discussed assessment and plan with resident.  Agree with above except where changed by me
Patient seen and examined.  Discussed assessment and plan with resident.  Agree with above except where changed by me

## 2020-07-31 NOTE — PROGRESS NOTE ADULT - SUBJECTIVE AND OBJECTIVE BOX
Patient is a 74y old  Male who presents with a chief complaint of L Femoral neck fracture (31 Jul 2020 11:35)       HPI:  Pt is a 73 yo M presenting from home after a fall resulting in L Femoral neck fracture. PMH HTN, HLD, "fast" HR.   Pt seen and examined at bedside.   He states that last night he was walking when all of a sudden his L leg buckled at the knee causing him to fall. He denies any head traume or LOC. He states that his leg simply gave out. He denies any assoc focal leg weakness, numbness or tingling. He has L hip pain, worse with movement, sharp, currently 4/10 in severity. No radiation elsewhere. He has no other complaints at this time.   Denies past hx of MI/TIA/CVA/CHF/DM2. He has never had any adverse reaction to anesthesia. no family hx of adverse reactions to anesthesia. He has no other complaints at this time. He can ambulate 2 blocks without associated chest pain or SOB.   Of note he was noted to have DONA and reports having taken Motrin for the past few days every 4-6hrs. (27 Jul 2020 18:23)       PAST MEDICAL & SURGICAL HISTORY:  Hyperlipidemia  Hypertension  Hyperlipidemia  Hypertension  H/O hernia repair       FAMILY HISTORY:  NC    Social History:Non smoker    MEDICATIONS  (STANDING):  acetaminophen   Tablet .. 650 milliGRAM(s) Oral every 6 hours  aspirin  chewable 81 milliGRAM(s) Oral daily  atorvastatin 20 milliGRAM(s) Oral at bedtime  BUpivacaine liposome 1.3% Injectable 20 milliLiter(s) Local Injection once  diltiazem    milliGRAM(s) Oral daily  enalapril 20 milliGRAM(s) Oral daily  enoxaparin Injectable 40 milliGRAM(s) SubCutaneous every 24 hours  multivitamin 1 Tablet(s) Oral daily  pantoprazole    Tablet 40 milliGRAM(s) Oral before breakfast  polyethylene glycol 3350 17 Gram(s) Oral daily  sodium chloride 0.9%. 1000 milliLiter(s) (100 mL/Hr) IV Continuous <Continuous>    MEDICATIONS  (PRN):  aluminum hydroxide/magnesium hydroxide/simethicone Suspension 30 milliLiter(s) Oral four times a day PRN Indigestion  benzocaine 15 mG/menthol 3.6 mG (Sugar-Free) Lozenge 1 Lozenge Oral three times a day PRN Sore Throat  bisacodyl Suppository 10 milliGRAM(s) Rectal daily PRN If no bowel movement by POD#2  hydrALAZINE 25 milliGRAM(s) Oral every 6 hours PRN Systolic blood pressure > 160  ondansetron Injectable 4 milliGRAM(s) IV Push every 6 hours PRN Nausea and/or Vomiting  senna 2 Tablet(s) Oral at bedtime PRN Constipation  traMADol 50 milliGRAM(s) Oral every 6 hours PRN Severe Pain (7 - 10)   Meds reviewed    Allergies    No Known Allergies    Intolerances    Interval HPI: No new complaints today.      REVIEW OF SYSTEMS:    EYES: No eye pain, visual disturbances, or discharge  NECK: No pain or stiffness  RESPIRATORY: No SOB. No wheeze. No PONCE  CARDIOVASCULAR: No chest pain, palpitations, dizziness,   GASTROINTESTINAL: No abdominal or epigastric pain. No nausea, vomiting, or hematemesis  GENITOURINARY: No dysuria, frequency, hematuria  NEUROLOGICAL: No headaches  MUSCULOSKELETAL: No joint pain or swelling   PSYCHIATRIC: No difficulty sleeping  Skin: incision site is dressed, clean dry and intact, no serosanguinous fluid drainage or discharge noted       Vital Signs Last 24 Hrs  T(C): 37.1 (31 Jul 2020 04:56), Max: 37.1 (31 Jul 2020 04:56)  T(F): 98.7 (31 Jul 2020 04:56), Max: 98.7 (31 Jul 2020 04:56)  HR: 75 (31 Jul 2020 04:56) (52 - 75)  BP: 164/79 (31 Jul 2020 06:14) (147/61 - 186/81)  BP(mean): --  RR: 18 (31 Jul 2020 04:56) (17 - 18)  SpO2: 96% (31 Jul 2020 04:56) (93% - 98%)  Daily     Daily     PHYSICAL EXAM:    GENERAL: NAD  HEAD:  Atraumatic, Normocephalic  EYES: EOMI, PERRLA, conjunctiva and sclera clear  ENMT: No tonsillar erythema, exudates, or enlargement; Moist mucous membranes, Good dentition, No lesions  NECK: Supple, neck  veins full  NERVOUS SYSTEM:  Alert & Oriented X3, Good concentration; Motor Strength wnl upper and lower extremities  CHEST/LUNG: Clear to percussion bilaterally; No rales, rhonchi, wheezing, or rubs  HEART: Regular rate and rhythm; No murmurs, rubs, or gallops  ABDOMEN: Soft, Nontender, Nondistended; Bowel sounds present, body wall and flank edema  EXTREMITIES:  Edema  LYMPH: No lymphadenopathy noted  SKIN: No rashes or lesions, pale      LABS:                        9.1    8.97  )-----------( 196      ( 31 Jul 2020 06:58 )             26.6     07-31    144  |  113<H>  |  35<H>  ----------------------------<  105<H>  4.3   |  28  |  1.30    Ca    8.5      31 Jul 2020 06:58    TPro  5.9<L>  /  Alb  2.5<L>  /  TBili  0.6  /  DBili  x   /  AST  34  /  ALT  15  /  AlkPhos  57  07-31                RADIOLOGY & ADDITIONAL TESTS: Patient is a 74y old  Male who presents with a chief complaint of L Femoral neck fracture (31 Jul 2020 11:35)       HPI:  Pt is a 73 yo M presenting from home after a fall resulting in L Femoral neck fracture. PMH HTN, HLD, "fast" HR.   Pt seen and examined at bedside.   He states that last night he was walking when all of a sudden his L leg buckled at the knee causing him to fall. He denies any head traume or LOC. He states that his leg simply gave out. He denies any assoc focal leg weakness, numbness or tingling. He has L hip pain, worse with movement, sharp, currently 4/10 in severity. No radiation elsewhere. He has no other complaints at this time.   Denies past hx of MI/TIA/CVA/CHF/DM2. He has never had any adverse reaction to anesthesia. no family hx of adverse reactions to anesthesia. He has no other complaints at this time. He can ambulate 2 blocks without associated chest pain or SOB.   Of note he was noted to have DONA and reports having taken Motrin for the past few days every 4-6hrs. (27 Jul 2020 18:23)     Patient has no new complaints.    PAST MEDICAL & SURGICAL HISTORY:  Hyperlipidemia  Hypertension  Hyperlipidemia  Hypertension  H/O hernia repair       FAMILY HISTORY:  NC    Social History:Non smoker    MEDICATIONS  (STANDING):  acetaminophen   Tablet .. 650 milliGRAM(s) Oral every 6 hours  aspirin  chewable 81 milliGRAM(s) Oral daily  atorvastatin 20 milliGRAM(s) Oral at bedtime  BUpivacaine liposome 1.3% Injectable 20 milliLiter(s) Local Injection once  diltiazem    milliGRAM(s) Oral daily  enalapril 20 milliGRAM(s) Oral daily  enoxaparin Injectable 40 milliGRAM(s) SubCutaneous every 24 hours  multivitamin 1 Tablet(s) Oral daily  pantoprazole    Tablet 40 milliGRAM(s) Oral before breakfast  polyethylene glycol 3350 17 Gram(s) Oral daily  sodium chloride 0.9%. 1000 milliLiter(s) (100 mL/Hr) IV Continuous <Continuous>    MEDICATIONS  (PRN):  aluminum hydroxide/magnesium hydroxide/simethicone Suspension 30 milliLiter(s) Oral four times a day PRN Indigestion  benzocaine 15 mG/menthol 3.6 mG (Sugar-Free) Lozenge 1 Lozenge Oral three times a day PRN Sore Throat  bisacodyl Suppository 10 milliGRAM(s) Rectal daily PRN If no bowel movement by POD#2  hydrALAZINE 25 milliGRAM(s) Oral every 6 hours PRN Systolic blood pressure > 160  ondansetron Injectable 4 milliGRAM(s) IV Push every 6 hours PRN Nausea and/or Vomiting  senna 2 Tablet(s) Oral at bedtime PRN Constipation  traMADol 50 milliGRAM(s) Oral every 6 hours PRN Severe Pain (7 - 10)   Meds reviewed    Allergies    No Known Allergies    Intolerances    Interval HPI: No new complaints today.      REVIEW OF SYSTEMS:    EYES: No eye pain, visual disturbances, or discharge  NECK: No pain or stiffness  RESPIRATORY: No SOB. No wheeze. No PONCE  CARDIOVASCULAR: No chest pain, palpitations, dizziness,   GASTROINTESTINAL: No abdominal or epigastric pain. No nausea, vomiting, or hematemesis  GENITOURINARY: No dysuria, frequency, hematuria  NEUROLOGICAL: No headaches  MUSCULOSKELETAL: No joint pain or swelling   PSYCHIATRIC: No difficulty sleeping  Skin: incision site is dressed, clean dry and intact, no serosanguinous fluid drainage or discharge noted       Vital Signs Last 24 Hrs  T(C): 37.1 (31 Jul 2020 04:56), Max: 37.1 (31 Jul 2020 04:56)  T(F): 98.7 (31 Jul 2020 04:56), Max: 98.7 (31 Jul 2020 04:56)  HR: 75 (31 Jul 2020 04:56) (52 - 75)  BP: 164/79 (31 Jul 2020 06:14) (147/61 - 186/81)  BP(mean): --  RR: 18 (31 Jul 2020 04:56) (17 - 18)  SpO2: 96% (31 Jul 2020 04:56) (93% - 98%)  Daily     Daily     PHYSICAL EXAM:    GENERAL: NAD  HEAD:  Atraumatic, Normocephalic  EYES: EOMI, PERRLA, conjunctiva and sclera clear  ENMT: No tonsillar erythema, exudates, or enlargement; Moist mucous membranes, Good dentition, No lesions  NECK: Supple, neck  veins full  NERVOUS SYSTEM:  Alert & Oriented X3, Good concentration; Motor Strength wnl upper and lower extremities  CHEST/LUNG: Clear to percussion bilaterally; No rales, rhonchi, wheezing, or rubs  HEART: Regular rate and rhythm; No murmurs, rubs, or gallops  ABDOMEN: Soft, Nontender, Nondistended; Bowel sounds present, body wall and flank edema  EXTREMITIES:  Edema  SKIN: No rashes or lesions, pale      LABS:                        9.1    8.97  )-----------( 196      ( 31 Jul 2020 06:58 )             26.6     07-31    144  |  113<H>  |  35<H>  ----------------------------<  105<H>  4.3   |  28  |  1.30    Ca    8.5      31 Jul 2020 06:58    TPro  5.9<L>  /  Alb  2.5<L>  /  TBili  0.6  /  DBili  x   /  AST  34  /  ALT  15  /  AlkPhos  57  07-31                RADIOLOGY & ADDITIONAL TESTS:

## 2020-07-31 NOTE — PROGRESS NOTE ADULT - ASSESSMENT
74 year old male PMH HTN, HLD, rapid HR (on Cardizem, no AC) herniated lumbar disc presents to ED s/p fall found to have acute mildly displaced left femoral neck fracture, now s/p left hip with hemiarthroplasty. Cardiology consulted for Cardiac optimization pre/post op.     s/p L hip NAZ  - Tolerated procedure without cardiac complications  - Ortho following   - Anemia- transfuse as per ortho  - Pain control  - Physical therapy     HTN  - BP: 164/79 (07-31-20 @ 06:14) (147/61 - 186/81)  - Continue Cardizem, hydralazine, and enalapril     HLD  - Continue statin     VTE ppx  - Continue enoxaparin   - Per primary team     - Monitor and replete lytes, keep K>4, Mg>2.  - All other medical needs as per primary team.  - Other cardiovascular workup will depend on clinical course.  - Will continue to follow.    Etta Navas, MS FNP, [facility]  Nurse Practitioner- Cardiology   Spectra #[phone]

## 2020-07-31 NOTE — PROGRESS NOTE ADULT - ASSESSMENT
Anemia multifactorial in etiology related to acute event, renal insufficiency as well as potentially being exacerbated by anticoagulation and antiplatelet therapy.    L femoral neck fx  s/p surgery    Blood loss due to fracture, bleeding from fracture and expected bleeding during surgery    Hgb stable.   FOBT neg  No treatable deficiencies.   Ferritin, B12, Folate adequate.     Renal fx improving.     Recommendations"  1.  Follow CBC   2.  Can continue asa and lovenox and observe.  This may still be consistent with post op care.  Does not require a transfusion and can observe.  3.  to re-evaluate CBC in am and decide on need of transfusion and potential discharge and additional recommendations  DVT prophyalxis as per routine in pt with ortho surgery.  Encourage hydration  Monitor urine outpt    discussed with patient.     Can see in office post DC for followup.     Thank you for consulting us.   No additional recommendations at current time.   Will sign off on case for now.   Please call, or re-consult if needed.

## 2020-07-31 NOTE — PROGRESS NOTE ADULT - SUBJECTIVE AND OBJECTIVE BOX
7/30 - Patient is seen and examined at bedside. Denies CP/SOB/Dizziness/N/V/D/HA. Pain is controlled.     7/31 - seen and examined at bedside this AM POD3 Denies CP/SOB/Dizziness/N/V/D/HA. Pain is controlled.     Vital Signs Last 24 Hrs  T(C): 37.1 (31 Jul 2020 04:56), Max: 37.1 (31 Jul 2020 04:56)  T(F): 98.7 (31 Jul 2020 04:56), Max: 98.7 (31 Jul 2020 04:56)  HR: 75 (31 Jul 2020 04:56) (52 - 75)  BP: 164/79 (31 Jul 2020 06:14) (147/61 - 186/81)  BP(mean): --  RR: 18 (31 Jul 2020 04:56) (17 - 18)  SpO2: 96% (31 Jul 2020 04:56) (93% - 98%)    LABS:                        9.1    8.97  )-----------( 196      ( 31 Jul 2020 06:58 )             26.6     31 Jul 2020 06:58    144    |  113    |  35     ----------------------------<  105    4.3     |  28     |  1.30     Ca    8.5        31 Jul 2020 06:58    TPro  5.9    /  Alb  2.5    /  TBili  0.6    /  DBili  x      /  AST  34     /  ALT  15     /  AlkPhos  57     31 Jul 2020 06:58    GEN: NAD: AAOx3; CNS grossly intact; no focal deficits  LLE: Dressing C/D/I. abduction pillow in place  Motor intact + EHL/FHL/TA/GS in the BL LE. Sensation is grossly intact distal . Extremity warm. Compartments are soft. DP 1+

## 2020-07-31 NOTE — PROGRESS NOTE ADULT - PROBLEM SELECTOR PROBLEM 1
Anemia due to blood loss
Closed fracture of left hip, initial encounter
Closed fracture of left hip, initial encounter

## 2020-07-31 NOTE — PROGRESS NOTE ADULT - SUBJECTIVE AND OBJECTIVE BOX
[INTERVAL HX: ]  Patient seen and examined;  Chart reviewed and events noted;   no CP, no SOB, trying to urinate in urinal.       Patient is a 74y Male with a known history of :  Fracture of unspecified part of neck of left femur, initial encounter for closed fracture (S72.002A) [Active]  Hyperlipidemia (E78.5) [Active]  Hypertension (I10) [Active]  Hyperlipidemia (E78.5) [Active]  Hypertension (I10) [Active]  H/O hernia repair (Z98.890) [Active]    HPI:  Pt is a 73 yo M presenting from home after a fall resulting in L Femoral neck fracture. PMH HTN, HLD, "fast" HR.   Pt seen and examined at bedside.   He states that last night he was walking when all of a sudden his L leg buckled at the knee causing him to fall. He denies any head traume or LOC. He states that his leg simply gave out. He denies any assoc focal leg weakness, numbness or tingling. He has L hip pain, worse with movement, sharp, currently 4/10 in severity. No radiation elsewhere. He has no other complaints at this time.   Denies past hx of MI/TIA/CVA/CHF/DM2. He has never had any adverse reaction to anesthesia. no family hx of adverse reactions to anesthesia. He has no other complaints at this time. He can ambulate 2 blocks without associated chest pain or SOB.   Of note he was noted to have DONA and reports having taken Motrin for the past few days every 4-6hrs. (27 Jul 2020 18:23)        MEDICATIONS  (STANDING):  acetaminophen   Tablet .. 650 milliGRAM(s) Oral every 6 hours  aspirin  chewable 81 milliGRAM(s) Oral daily  atorvastatin 20 milliGRAM(s) Oral at bedtime  BUpivacaine liposome 1.3% Injectable 20 milliLiter(s) Local Injection once  diltiazem    milliGRAM(s) Oral daily  enalapril 20 milliGRAM(s) Oral daily  enoxaparin Injectable 40 milliGRAM(s) SubCutaneous every 24 hours  multivitamin 1 Tablet(s) Oral daily  pantoprazole    Tablet 40 milliGRAM(s) Oral before breakfast  polyethylene glycol 3350 17 Gram(s) Oral daily  sodium chloride 0.9%. 1000 milliLiter(s) (100 mL/Hr) IV Continuous <Continuous>    MEDICATIONS  (PRN):  aluminum hydroxide/magnesium hydroxide/simethicone Suspension 30 milliLiter(s) Oral four times a day PRN Indigestion  benzocaine 15 mG/menthol 3.6 mG (Sugar-Free) Lozenge 1 Lozenge Oral three times a day PRN Sore Throat  bisacodyl Suppository 10 milliGRAM(s) Rectal daily PRN If no bowel movement by POD#2  hydrALAZINE 25 milliGRAM(s) Oral every 6 hours PRN Systolic blood pressure > 160  ondansetron Injectable 4 milliGRAM(s) IV Push every 6 hours PRN Nausea and/or Vomiting  senna 2 Tablet(s) Oral at bedtime PRN Constipation  traMADol 50 milliGRAM(s) Oral every 6 hours PRN Severe Pain (7 - 10)      Vital Signs Last 24 Hrs  T(C): 37.1 (31 Jul 2020 04:56), Max: 37.1 (31 Jul 2020 04:56)  T(F): 98.7 (31 Jul 2020 04:56), Max: 98.7 (31 Jul 2020 04:56)  HR: 75 (31 Jul 2020 04:56) (52 - 75)  BP: 164/79 (31 Jul 2020 06:14) (147/61 - 186/81)  BP(mean): --  RR: 18 (31 Jul 2020 04:56) (17 - 18)  SpO2: 96% (31 Jul 2020 04:56) (93% - 98%)      [PHYSICAL EXAM]  General: adult in NAD,  WN,  WD.  HEENT: clear oropharynx, anicteric sclera, pink conjunctivae.  Neck: supple, no masses.  CV: normal S1S2, no murmur, no rubs, no gallops.  Lungs: clear to auscultation, no wheezes, no rales, no rhonchi.  Abdomen: soft, non-tender, non-distended, no hepatosplenomegaly, normal BS, no guarding.  Ext: no clubbing, no cyanosis, trace edema.  Skin: no rashes,  no petechiae, no venous stasis changes.  Neuro: alert and oriented X3  , no focal motor deficits.  LN: no SC LAUREN.      [LABS:]                        9.1    8.97  )-----------( 196      ( 31 Jul 2020 06:58 )             26.6     07-31    144  |  113<H>  |  35<H>  ----------------------------<  105<H>  4.3   |  28  |  1.30    Ca    8.5      31 Jul 2020 06:58    TPro  5.9<L>  /  Alb  2.5<L>  /  TBili  0.6  /  DBili  x   /  AST  34  /  ALT  15  /  AlkPhos  57  07-31    Occult Blood, Feces (07.31.20 @ 07:07)    Occult Blood, Feces: Negative: Method: Peroxidase Catalyzation Activity    Ferritin, Serum (07.30.20 @ 23:01)    Ferritin, Serum: 441 ng/mL    Iron with Total Binding Capacity (07.30.20 @ 22:36)    Iron - Total Binding Capacity.: 225 ug/dL    % Saturation, Iron: 10 %    Iron Total, Serum: 23 ug/dL    Unsaturated Iron Binding Capacity: 202 ug/dL    Vitamin B12, Serum (07.30.20 @ 23:01)    Vitamin B12, Serum: 736 pg/mL    Folate, Serum (07.30.20 @ 23:01)    Folate, Serum: 16.0 ng/mL                [RADIOLOGY STUDIES:]

## 2020-07-31 NOTE — DISCHARGE NOTE NURSING/CASE MANAGEMENT/SOCIAL WORK - PATIENT PORTAL LINK FT
You can access the FollowMyHealth Patient Portal offered by [facility] by registering at the following website: [email or web address]. By joining [employer]’s FollowMyHealth portal, you will also be able to view your health information using other applications (apps) compatible with our system.
